# Patient Record
Sex: FEMALE | Race: WHITE | NOT HISPANIC OR LATINO | Employment: FULL TIME | URBAN - NONMETROPOLITAN AREA
[De-identification: names, ages, dates, MRNs, and addresses within clinical notes are randomized per-mention and may not be internally consistent; named-entity substitution may affect disease eponyms.]

---

## 2017-02-06 ENCOUNTER — TRANSCRIBE ORDERS (OUTPATIENT)
Dept: ADMINISTRATIVE | Facility: HOSPITAL | Age: 47
End: 2017-02-06

## 2017-02-06 DIAGNOSIS — Z12.31 VISIT FOR SCREENING MAMMOGRAM: Primary | ICD-10-CM

## 2017-02-06 DIAGNOSIS — Z13.820 SCREENING FOR OSTEOPOROSIS: ICD-10-CM

## 2017-02-16 ENCOUNTER — HOSPITAL ENCOUNTER (OUTPATIENT)
Dept: BONE DENSITY | Facility: HOSPITAL | Age: 47
Discharge: HOME | End: 2017-02-16

## 2017-02-16 ENCOUNTER — TRANSCRIBE ORDERS (OUTPATIENT)
Dept: FAMILY MEDICINE CLINIC | Facility: CLINIC | Age: 47
End: 2017-02-16

## 2017-02-16 ENCOUNTER — LAB (OUTPATIENT)
Dept: LAB | Facility: HOSPITAL | Age: 47
End: 2017-02-16

## 2017-02-16 ENCOUNTER — HOSPITAL ENCOUNTER (OUTPATIENT)
Dept: MAMMOGRAPHY | Facility: HOSPITAL | Age: 47
Discharge: HOME | End: 2017-02-16
Admitting: NURSE PRACTITIONER

## 2017-02-16 DIAGNOSIS — E03.9 HYPOTHYROIDISM, UNSPECIFIED TYPE: ICD-10-CM

## 2017-02-16 DIAGNOSIS — E03.9 HYPOTHYROIDISM, UNSPECIFIED TYPE: Primary | ICD-10-CM

## 2017-02-16 DIAGNOSIS — Z12.31 VISIT FOR SCREENING MAMMOGRAM: ICD-10-CM

## 2017-02-16 DIAGNOSIS — Z13.820 SCREENING FOR OSTEOPOROSIS: ICD-10-CM

## 2017-02-16 LAB
ALBUMIN SERPL-MCNC: 4.4 G/DL (ref 3.5–5)
ALBUMIN/GLOB SERPL: 1.4 G/DL (ref 1.5–2.5)
ALP SERPL-CCNC: 78 U/L (ref 35–104)
ALT SERPL W P-5'-P-CCNC: 15 U/L (ref 10–36)
ANION GAP SERPL CALCULATED.3IONS-SCNC: 3.6 MMOL/L (ref 3.6–11.2)
AST SERPL-CCNC: 23 U/L (ref 10–30)
BILIRUB SERPL-MCNC: 0.6 MG/DL (ref 0.2–1.8)
BUN BLD-MCNC: 19 MG/DL (ref 7–21)
BUN/CREAT SERPL: 17.1 (ref 7–25)
CALCIUM SPEC-SCNC: 9.4 MG/DL (ref 7.7–10)
CHLORIDE SERPL-SCNC: 107 MMOL/L (ref 99–112)
CHOLEST SERPL-MCNC: 214 MG/DL (ref 0–200)
CO2 SERPL-SCNC: 28.4 MMOL/L (ref 24.3–31.9)
CREAT BLD-MCNC: 1.11 MG/DL (ref 0.43–1.29)
GFR SERPL CREATININE-BSD FRML MDRD: 52 ML/MIN/1.73
GLOBULIN UR ELPH-MCNC: 3.2 GM/DL
GLUCOSE BLD-MCNC: 91 MG/DL (ref 70–110)
HDLC SERPL-MCNC: 57 MG/DL (ref 60–100)
LDLC SERPL CALC-MCNC: 135 MG/DL (ref 0–100)
LDLC/HDLC SERPL: 2.36 {RATIO}
OSMOLALITY SERPL CALC.SUM OF ELEC: 279.4 MOSM/KG (ref 273–305)
POTASSIUM BLD-SCNC: 4.2 MMOL/L (ref 3.5–5.3)
PROT SERPL-MCNC: 7.6 G/DL (ref 6–8)
SODIUM BLD-SCNC: 139 MMOL/L (ref 135–153)
T4 FREE SERPL-MCNC: 1.11 NG/DL (ref 0.89–1.76)
TRIGL SERPL-MCNC: 111 MG/DL (ref 0–150)
TSH SERPL DL<=0.05 MIU/L-ACNC: 1.46 MIU/ML (ref 0.55–4.78)
VLDLC SERPL-MCNC: 22.2 MG/DL

## 2017-02-16 PROCEDURE — 77067 SCR MAMMO BI INCL CAD: CPT | Performed by: RADIOLOGY

## 2017-02-16 PROCEDURE — 84439 ASSAY OF FREE THYROXINE: CPT | Performed by: NURSE PRACTITIONER

## 2017-02-16 PROCEDURE — 77063 BREAST TOMOSYNTHESIS BI: CPT

## 2017-02-16 PROCEDURE — 77080 DXA BONE DENSITY AXIAL: CPT | Performed by: RADIOLOGY

## 2017-02-16 PROCEDURE — 80053 COMPREHEN METABOLIC PANEL: CPT | Performed by: NURSE PRACTITIONER

## 2017-02-16 PROCEDURE — 77080 DXA BONE DENSITY AXIAL: CPT

## 2017-02-16 PROCEDURE — 36415 COLL VENOUS BLD VENIPUNCTURE: CPT

## 2017-02-16 PROCEDURE — G0202 SCR MAMMO BI INCL CAD: HCPCS

## 2017-02-16 PROCEDURE — 77063 BREAST TOMOSYNTHESIS BI: CPT | Performed by: RADIOLOGY

## 2017-02-16 PROCEDURE — 80061 LIPID PANEL: CPT | Performed by: NURSE PRACTITIONER

## 2017-02-16 PROCEDURE — 84443 ASSAY THYROID STIM HORMONE: CPT | Performed by: NURSE PRACTITIONER

## 2017-04-07 ENCOUNTER — TRANSCRIBE ORDERS (OUTPATIENT)
Dept: ADMINISTRATIVE | Facility: HOSPITAL | Age: 47
End: 2017-04-07

## 2017-04-07 ENCOUNTER — LAB (OUTPATIENT)
Dept: LAB | Facility: HOSPITAL | Age: 47
End: 2017-04-07

## 2017-04-07 DIAGNOSIS — E55.9 VITAMIN D DEFICIENCY: ICD-10-CM

## 2017-04-07 DIAGNOSIS — R53.83 FATIGUE, UNSPECIFIED TYPE: ICD-10-CM

## 2017-04-07 DIAGNOSIS — Z79.890 NEED FOR PROPHYLACTIC HORMONE REPLACEMENT THERAPY (POSTMENOPAUSAL): Primary | ICD-10-CM

## 2017-04-07 DIAGNOSIS — Z79.890 NEED FOR PROPHYLACTIC HORMONE REPLACEMENT THERAPY (POSTMENOPAUSAL): ICD-10-CM

## 2017-04-07 LAB
25(OH)D3 SERPL-MCNC: 22 NG/ML
ESTRADIOL SERPL HS-MCNC: 20.1 PG/ML
FSH SERPL-ACNC: 56.2 MIU/ML
PROGEST SERPL-MCNC: 0.5 NG/ML
T3RU NFR SERPL: 28.9 % (ref 22.5–37)
T4 SERPL-MCNC: 9.8 MCG/DL (ref 4.5–10.9)
TSH SERPL DL<=0.05 MIU/L-ACNC: 1.2 MIU/ML (ref 0.55–4.78)
VIT B12 BLD-MCNC: 1281 PG/ML (ref 211–911)

## 2017-04-07 PROCEDURE — 84443 ASSAY THYROID STIM HORMONE: CPT | Performed by: NURSE PRACTITIONER

## 2017-04-07 PROCEDURE — 82627 DEHYDROEPIANDROSTERONE: CPT | Performed by: NURSE PRACTITIONER

## 2017-04-07 PROCEDURE — 82306 VITAMIN D 25 HYDROXY: CPT | Performed by: NURSE PRACTITIONER

## 2017-04-07 PROCEDURE — 84403 ASSAY OF TOTAL TESTOSTERONE: CPT | Performed by: NURSE PRACTITIONER

## 2017-04-07 PROCEDURE — 82607 VITAMIN B-12: CPT | Performed by: NURSE PRACTITIONER

## 2017-04-07 PROCEDURE — 84436 ASSAY OF TOTAL THYROXINE: CPT | Performed by: NURSE PRACTITIONER

## 2017-04-07 PROCEDURE — 83001 ASSAY OF GONADOTROPIN (FSH): CPT | Performed by: NURSE PRACTITIONER

## 2017-04-07 PROCEDURE — 84402 ASSAY OF FREE TESTOSTERONE: CPT | Performed by: NURSE PRACTITIONER

## 2017-04-07 PROCEDURE — 84479 ASSAY OF THYROID (T3 OR T4): CPT | Performed by: NURSE PRACTITIONER

## 2017-04-07 PROCEDURE — 82670 ASSAY OF TOTAL ESTRADIOL: CPT | Performed by: NURSE PRACTITIONER

## 2017-04-07 PROCEDURE — 84144 ASSAY OF PROGESTERONE: CPT | Performed by: NURSE PRACTITIONER

## 2017-04-07 PROCEDURE — 36415 COLL VENOUS BLD VENIPUNCTURE: CPT

## 2017-04-08 LAB
DHEA-S SERPL-MCNC: 149.4 UG/DL (ref 41.2–243.7)
TESTOST FREE SERPL-MCNC: 1.9 PG/ML (ref 0–4.2)
TESTOST SERPL-MCNC: 20 NG/DL (ref 8–48)

## 2017-08-24 ENCOUNTER — TRANSCRIBE ORDERS (OUTPATIENT)
Dept: ADMINISTRATIVE | Facility: HOSPITAL | Age: 47
End: 2017-08-24

## 2017-08-24 ENCOUNTER — LAB (OUTPATIENT)
Dept: LAB | Facility: HOSPITAL | Age: 47
End: 2017-08-24

## 2017-08-24 DIAGNOSIS — E03.9 UNSPECIFIED HYPOTHYROIDISM: ICD-10-CM

## 2017-08-24 DIAGNOSIS — E78.5 HYPERLIPIDEMIA, UNSPECIFIED HYPERLIPIDEMIA TYPE: Primary | ICD-10-CM

## 2017-08-24 DIAGNOSIS — E78.5 HYPERLIPIDEMIA, UNSPECIFIED HYPERLIPIDEMIA TYPE: ICD-10-CM

## 2017-08-24 LAB
ALBUMIN SERPL-MCNC: 4.6 G/DL (ref 3.5–5)
ALBUMIN/GLOB SERPL: 1.6 G/DL (ref 1.5–2.5)
ALP SERPL-CCNC: 70 U/L (ref 35–104)
ALT SERPL W P-5'-P-CCNC: 16 U/L (ref 10–36)
ANION GAP SERPL CALCULATED.3IONS-SCNC: 2.1 MMOL/L (ref 3.6–11.2)
AST SERPL-CCNC: 25 U/L (ref 10–30)
BASOPHILS # BLD AUTO: 0.08 10*3/MM3 (ref 0–0.3)
BASOPHILS NFR BLD AUTO: 1.5 % (ref 0–2)
BILIRUB SERPL-MCNC: 0.5 MG/DL (ref 0.2–1.8)
BUN BLD-MCNC: 15 MG/DL (ref 7–21)
BUN/CREAT SERPL: 14.3 (ref 7–25)
CALCIUM SPEC-SCNC: 9.7 MG/DL (ref 7.7–10)
CHLORIDE SERPL-SCNC: 109 MMOL/L (ref 99–112)
CHOLEST SERPL-MCNC: 166 MG/DL (ref 0–200)
CO2 SERPL-SCNC: 28.9 MMOL/L (ref 24.3–31.9)
CREAT BLD-MCNC: 1.05 MG/DL (ref 0.43–1.29)
DEPRECATED RDW RBC AUTO: 40.7 FL (ref 37–54)
EOSINOPHIL # BLD AUTO: 0.18 10*3/MM3 (ref 0–0.7)
EOSINOPHIL NFR BLD AUTO: 3.5 % (ref 0–5)
ERYTHROCYTE [DISTWIDTH] IN BLOOD BY AUTOMATED COUNT: 12.5 % (ref 11.5–14.5)
GFR SERPL CREATININE-BSD FRML MDRD: 56 ML/MIN/1.73
GLOBULIN UR ELPH-MCNC: 2.9 GM/DL
GLUCOSE BLD-MCNC: 94 MG/DL (ref 70–110)
HCT VFR BLD AUTO: 41.7 % (ref 37–47)
HDLC SERPL-MCNC: 52 MG/DL (ref 60–100)
HGB BLD-MCNC: 13.8 G/DL (ref 12–16)
IMM GRANULOCYTES # BLD: 0.01 10*3/MM3 (ref 0–0.03)
IMM GRANULOCYTES NFR BLD: 0.2 % (ref 0–0.5)
LDLC SERPL CALC-MCNC: 95 MG/DL (ref 0–100)
LDLC/HDLC SERPL: 1.82 {RATIO}
LYMPHOCYTES # BLD AUTO: 1.7 10*3/MM3 (ref 1–3)
LYMPHOCYTES NFR BLD AUTO: 32.7 % (ref 21–51)
MCH RBC QN AUTO: 29.9 PG (ref 27–33)
MCHC RBC AUTO-ENTMCNC: 33.1 G/DL (ref 33–37)
MCV RBC AUTO: 90.3 FL (ref 80–94)
MONOCYTES # BLD AUTO: 0.4 10*3/MM3 (ref 0.1–0.9)
MONOCYTES NFR BLD AUTO: 7.7 % (ref 0–10)
NEUTROPHILS # BLD AUTO: 2.83 10*3/MM3 (ref 1.4–6.5)
NEUTROPHILS NFR BLD AUTO: 54.4 % (ref 30–70)
OSMOLALITY SERPL CALC.SUM OF ELEC: 280 MOSM/KG (ref 273–305)
PLATELET # BLD AUTO: 228 10*3/MM3 (ref 130–400)
PMV BLD AUTO: 11.3 FL (ref 6–10)
POTASSIUM BLD-SCNC: 4.2 MMOL/L (ref 3.5–5.3)
PROT SERPL-MCNC: 7.5 G/DL (ref 6–8)
RBC # BLD AUTO: 4.62 10*6/MM3 (ref 4.2–5.4)
SODIUM BLD-SCNC: 140 MMOL/L (ref 135–153)
T4 SERPL-MCNC: 6.3 MCG/DL (ref 4.5–10.9)
TRIGL SERPL-MCNC: 96 MG/DL (ref 0–150)
TSH SERPL DL<=0.05 MIU/L-ACNC: 1.97 MIU/ML (ref 0.55–4.78)
VLDLC SERPL-MCNC: 19.2 MG/DL
WBC NRBC COR # BLD: 5.2 10*3/MM3 (ref 4.5–12.5)

## 2017-08-24 PROCEDURE — 80053 COMPREHEN METABOLIC PANEL: CPT | Performed by: NURSE PRACTITIONER

## 2017-08-24 PROCEDURE — 85025 COMPLETE CBC W/AUTO DIFF WBC: CPT | Performed by: NURSE PRACTITIONER

## 2017-08-24 PROCEDURE — 36415 COLL VENOUS BLD VENIPUNCTURE: CPT

## 2017-08-24 PROCEDURE — 84443 ASSAY THYROID STIM HORMONE: CPT | Performed by: NURSE PRACTITIONER

## 2017-08-24 PROCEDURE — 80061 LIPID PANEL: CPT | Performed by: NURSE PRACTITIONER

## 2017-08-24 PROCEDURE — 84436 ASSAY OF TOTAL THYROXINE: CPT | Performed by: NURSE PRACTITIONER

## 2017-08-25 ENCOUNTER — TRANSCRIBE ORDERS (OUTPATIENT)
Dept: ADMINISTRATIVE | Facility: HOSPITAL | Age: 47
End: 2017-08-25

## 2017-08-25 ENCOUNTER — HOSPITAL ENCOUNTER (OUTPATIENT)
Dept: RESPIRATORY THERAPY | Facility: HOSPITAL | Age: 47
Discharge: HOME | End: 2017-08-25
Admitting: NURSE PRACTITIONER

## 2017-08-25 DIAGNOSIS — R00.1 BRADYCARDIA: ICD-10-CM

## 2017-08-25 DIAGNOSIS — R00.1 BRADYCARDIA: Primary | ICD-10-CM

## 2017-08-25 PROCEDURE — 93005 ELECTROCARDIOGRAM TRACING: CPT | Performed by: NURSE PRACTITIONER

## 2017-08-25 PROCEDURE — 93010 ELECTROCARDIOGRAM REPORT: CPT | Performed by: INTERNAL MEDICINE

## 2017-08-31 ENCOUNTER — OFFICE VISIT (OUTPATIENT)
Dept: CARDIOLOGY | Facility: CLINIC | Age: 47
End: 2017-08-31

## 2017-08-31 VITALS
BODY MASS INDEX: 32.43 KG/M2 | HEART RATE: 72 BPM | HEIGHT: 63 IN | WEIGHT: 183 LBS | SYSTOLIC BLOOD PRESSURE: 106 MMHG | RESPIRATION RATE: 16 BRPM | DIASTOLIC BLOOD PRESSURE: 73 MMHG

## 2017-08-31 DIAGNOSIS — R00.1 SINUS BRADYCARDIA: Primary | ICD-10-CM

## 2017-08-31 DIAGNOSIS — E78.5 DYSLIPIDEMIA: ICD-10-CM

## 2017-08-31 DIAGNOSIS — E03.9 HYPOTHYROIDISM, UNSPECIFIED TYPE: ICD-10-CM

## 2017-08-31 PROCEDURE — 99204 OFFICE O/P NEW MOD 45 MIN: CPT | Performed by: INTERNAL MEDICINE

## 2017-08-31 RX ORDER — LORATADINE 10 MG/1
10 TABLET ORAL DAILY
COMMUNITY
End: 2022-09-12

## 2017-09-02 ENCOUNTER — HOSPITAL ENCOUNTER (OUTPATIENT)
Dept: CARDIOLOGY | Facility: HOSPITAL | Age: 47
Discharge: HOME | End: 2017-09-02
Attending: INTERNAL MEDICINE | Admitting: INTERNAL MEDICINE

## 2017-09-02 DIAGNOSIS — R00.1 SINUS BRADYCARDIA: ICD-10-CM

## 2017-09-02 PROCEDURE — 93306 TTE W/DOPPLER COMPLETE: CPT

## 2017-09-02 PROCEDURE — 93306 TTE W/DOPPLER COMPLETE: CPT | Performed by: INTERNAL MEDICINE

## 2017-09-03 ENCOUNTER — HOSPITAL ENCOUNTER (OUTPATIENT)
Dept: RESPIRATORY THERAPY | Facility: HOSPITAL | Age: 47
Discharge: HOME | End: 2017-09-03
Attending: INTERNAL MEDICINE | Admitting: INTERNAL MEDICINE

## 2017-09-03 DIAGNOSIS — R00.1 SINUS BRADYCARDIA: ICD-10-CM

## 2017-09-03 PROCEDURE — 93227 XTRNL ECG REC<48 HR R&I: CPT | Performed by: INTERNAL MEDICINE

## 2017-09-03 PROCEDURE — 93225 XTRNL ECG REC<48 HRS REC: CPT

## 2017-09-03 PROCEDURE — 93226 XTRNL ECG REC<48 HR SCAN A/R: CPT

## 2017-09-06 LAB
BH CV ECHO MEAS - % IVS THICK: 35 %
BH CV ECHO MEAS - % LVPW THICK: 8 %
BH CV ECHO MEAS - ACS: 1.5 CM
BH CV ECHO MEAS - AO ROOT AREA (BSA CORRECTED): 1.3
BH CV ECHO MEAS - AO ROOT AREA: 4.7 CM^2
BH CV ECHO MEAS - AO ROOT DIAM: 2.5 CM
BH CV ECHO MEAS - BSA(HAYCOCK): 1.9 M^2
BH CV ECHO MEAS - BSA: 1.8 M^2
BH CV ECHO MEAS - BZI_BMI: 31.5 KILOGRAMS/M^2
BH CV ECHO MEAS - BZI_METRIC_HEIGHT: 160 CM
BH CV ECHO MEAS - BZI_METRIC_WEIGHT: 80.7 KG
BH CV ECHO MEAS - CONTRAST EF 4CH: 58.8 ML/M^2
BH CV ECHO MEAS - EDV(CUBED): 124 ML
BH CV ECHO MEAS - EDV(MOD-SP4): 34 ML
BH CV ECHO MEAS - EDV(TEICH): 117.5 ML
BH CV ECHO MEAS - EF(CUBED): 57.8 %
BH CV ECHO MEAS - EF(MOD-SP4): 58.8 %
BH CV ECHO MEAS - EF(TEICH): 49.2 %
BH CV ECHO MEAS - ESV(CUBED): 52.3 ML
BH CV ECHO MEAS - ESV(MOD-SP4): 14 ML
BH CV ECHO MEAS - ESV(TEICH): 59.6 ML
BH CV ECHO MEAS - FS: 25 %
BH CV ECHO MEAS - IVS/LVPW: 0.8
BH CV ECHO MEAS - IVSD: 0.73 CM
BH CV ECHO MEAS - IVSS: 0.99 CM
BH CV ECHO MEAS - LA DIMENSION: 2.6 CM
BH CV ECHO MEAS - LA/AO: 1
BH CV ECHO MEAS - LV DIASTOLIC VOL/BSA (35-75): 18.5 ML/M^2
BH CV ECHO MEAS - LV MASS(C)D: 140.6 GRAMS
BH CV ECHO MEAS - LV MASS(C)DI: 76.4 GRAMS/M^2
BH CV ECHO MEAS - LV MASS(C)S: 112.8 GRAMS
BH CV ECHO MEAS - LV MASS(C)SI: 61.3 GRAMS/M^2
BH CV ECHO MEAS - LV SYSTOLIC VOL/BSA (12-30): 7.6 ML/M^2
BH CV ECHO MEAS - LVIDD: 5 CM
BH CV ECHO MEAS - LVIDS: 3.7 CM
BH CV ECHO MEAS - LVLD AP4: 5.8 CM
BH CV ECHO MEAS - LVLS AP4: 4.4 CM
BH CV ECHO MEAS - LVOT AREA (M): 2.3 CM^2
BH CV ECHO MEAS - LVOT AREA: 2.2 CM^2
BH CV ECHO MEAS - LVOT DIAM: 1.7 CM
BH CV ECHO MEAS - LVPWD: 0.92 CM
BH CV ECHO MEAS - LVPWS: 0.99 CM
BH CV ECHO MEAS - MV A MAX VEL: 64.2 CM/SEC
BH CV ECHO MEAS - MV DEC SLOPE: 599 CM/SEC^2
BH CV ECHO MEAS - MV E MAX VEL: 109.1 CM/SEC
BH CV ECHO MEAS - MV E/A: 1.7
BH CV ECHO MEAS - MV P1/2T MAX VEL: 111.3 CM/SEC
BH CV ECHO MEAS - MV P1/2T: 54.4 MSEC
BH CV ECHO MEAS - MVA P1/2T LCG: 2 CM^2
BH CV ECHO MEAS - MVA(P1/2T): 4 CM^2
BH CV ECHO MEAS - RAP SYSTOLE: 10 MMHG
BH CV ECHO MEAS - RVDD: 0.88 CM
BH CV ECHO MEAS - RVSP: 37.7 MMHG
BH CV ECHO MEAS - SI(CUBED): 38.9 ML/M^2
BH CV ECHO MEAS - SI(MOD-SP4): 10.9 ML/M^2
BH CV ECHO MEAS - SI(TEICH): 31.4 ML/M^2
BH CV ECHO MEAS - SV(CUBED): 71.7 ML
BH CV ECHO MEAS - SV(MOD-SP4): 20 ML
BH CV ECHO MEAS - SV(TEICH): 57.9 ML
BH CV ECHO MEAS - TR MAX VEL: 263.3 CM/SEC

## 2017-10-12 ENCOUNTER — LAB (OUTPATIENT)
Dept: LAB | Facility: HOSPITAL | Age: 47
End: 2017-10-12

## 2017-10-12 ENCOUNTER — OFFICE VISIT (OUTPATIENT)
Dept: CARDIOLOGY | Facility: CLINIC | Age: 47
End: 2017-10-12

## 2017-10-12 ENCOUNTER — TRANSCRIBE ORDERS (OUTPATIENT)
Dept: ADMINISTRATIVE | Facility: HOSPITAL | Age: 47
End: 2017-10-12

## 2017-10-12 VITALS
WEIGHT: 183 LBS | BODY MASS INDEX: 32.43 KG/M2 | DIASTOLIC BLOOD PRESSURE: 79 MMHG | HEART RATE: 76 BPM | SYSTOLIC BLOOD PRESSURE: 111 MMHG | HEIGHT: 63 IN

## 2017-10-12 DIAGNOSIS — Z79.890 POSTMENOPAUSAL HORMONE THERAPY: Primary | ICD-10-CM

## 2017-10-12 DIAGNOSIS — E03.9 HYPOTHYROIDISM, UNSPECIFIED TYPE: ICD-10-CM

## 2017-10-12 DIAGNOSIS — R53.83 EXTREME EXHAUSTION: ICD-10-CM

## 2017-10-12 DIAGNOSIS — R00.1 SINUS BRADYCARDIA: Primary | ICD-10-CM

## 2017-10-12 DIAGNOSIS — R42 DIZZINESS: ICD-10-CM

## 2017-10-12 DIAGNOSIS — Z79.890 POSTMENOPAUSAL HORMONE THERAPY: ICD-10-CM

## 2017-10-12 DIAGNOSIS — E78.5 DYSLIPIDEMIA: ICD-10-CM

## 2017-10-12 DIAGNOSIS — E55.9 VITAMIN D INSUFFICIENCY: ICD-10-CM

## 2017-10-12 LAB
25(OH)D3 SERPL-MCNC: 33 NG/ML
ESTRADIOL SERPL HS-MCNC: 32.8 PG/ML
FSH SERPL-ACNC: 65.6 MIU/ML
PROGEST SERPL-MCNC: 1 NG/ML
T3RU NFR SERPL: 30 % (ref 22.5–37)
T4 SERPL-MCNC: 5.9 MCG/DL (ref 4.5–10.9)
TSH SERPL DL<=0.05 MIU/L-ACNC: 1.78 MIU/ML (ref 0.55–4.78)

## 2017-10-12 PROCEDURE — 84403 ASSAY OF TOTAL TESTOSTERONE: CPT | Performed by: NURSE PRACTITIONER

## 2017-10-12 PROCEDURE — 82627 DEHYDROEPIANDROSTERONE: CPT | Performed by: NURSE PRACTITIONER

## 2017-10-12 PROCEDURE — 99213 OFFICE O/P EST LOW 20 MIN: CPT | Performed by: PHYSICIAN ASSISTANT

## 2017-10-12 PROCEDURE — 84402 ASSAY OF FREE TESTOSTERONE: CPT | Performed by: NURSE PRACTITIONER

## 2017-10-12 PROCEDURE — 82670 ASSAY OF TOTAL ESTRADIOL: CPT | Performed by: NURSE PRACTITIONER

## 2017-10-12 PROCEDURE — 84436 ASSAY OF TOTAL THYROXINE: CPT | Performed by: NURSE PRACTITIONER

## 2017-10-12 PROCEDURE — 84479 ASSAY OF THYROID (T3 OR T4): CPT | Performed by: NURSE PRACTITIONER

## 2017-10-12 PROCEDURE — 84443 ASSAY THYROID STIM HORMONE: CPT | Performed by: NURSE PRACTITIONER

## 2017-10-12 PROCEDURE — 83001 ASSAY OF GONADOTROPIN (FSH): CPT | Performed by: NURSE PRACTITIONER

## 2017-10-12 PROCEDURE — 84144 ASSAY OF PROGESTERONE: CPT | Performed by: NURSE PRACTITIONER

## 2017-10-12 PROCEDURE — 82306 VITAMIN D 25 HYDROXY: CPT | Performed by: NURSE PRACTITIONER

## 2017-10-12 PROCEDURE — 36415 COLL VENOUS BLD VENIPUNCTURE: CPT

## 2017-10-12 NOTE — PROGRESS NOTES
JULY Rockwell  Kristy Iqbal  1970  10/12/2017    Patient Active Problem List   Diagnosis   • Sinus bradycardia in the 50s.   • Dyslipidemia   • Hypothyroidism     Dear JULY Rockwell:    Chief Complaint   Patient presents with   • Slow Heart Rate     bradycardia follow up   • Results     holter and echo     Subjective     Kristy Iqbal is a 47 y.o. female with a past medical history significant for Previously noted mild sinus bradycardia in the 50s as well as intermittent low blood pressure both associated with mild dizziness.  She recently had a 24-hour Holter monitor revealing an average heart rate of 64 bpm.  There were occasional PACs and PVCs.  Lowest heart rate was 46 bpm which occurred at 4:12 AM.  There were occasional PACs and PVCs.  She also had a transthoracic echocardiogram revealing a normal ejection fraction at 60-65%.  There is no significant valvular dysfunction and no pericardial effusion. She presents back to the office today. She has not had any significant episodes of palpitations or dizziness/near syncope. She had one episode of faster heart beat when she got up in the middle of the night to go to the restroom. It occurred when she was getting back in bed. No further since then. She did not check her BP at that time. No know history of sugar issues or gestational diabetes with her pregnancies. She denies any chest pains or shortness of breath.       Current Outpatient Prescriptions:   •  DHEA 10 MG capsule, Take 10 mg by mouth Daily., Disp: , Rfl:   •  loratadine (CLARITIN) 10 MG tablet, Take 10 mg by mouth Daily., Disp: , Rfl:   •  rosuvastatin (CRESTOR) 5 MG tablet, Take 1 tablet by mouth Daily., Disp: 30 tablet, Rfl: 5  •  Thyroid (ARMOUR THYROID) 60 MG PO tablet, Take 1 tablet by mouth Daily., Disp: 30 tablet, Rfl: 3    The following portions of the patient's history were reviewed and updated as appropriate: allergies, current medications, past family history, past  "medical history, past social history, past surgical history and problem list.    Social History     Social History   • Marital status:      Spouse name: N/A   • Number of children: N/A   • Years of education: N/A     Occupational History   • Not on file.     Social History Main Topics   • Smoking status: Never Smoker   • Smokeless tobacco: Never Used   • Alcohol use No   • Drug use: No   • Sexual activity: Not on file     Other Topics Concern   • Not on file     Social History Narrative     Review of Systems   Constitution: Negative for chills, fever and malaise/fatigue.   Cardiovascular: Positive for palpitations (once in awhile). Negative for chest pain, leg swelling, near-syncope and syncope.   Respiratory: Negative for shortness of breath.    Neurological: Dizziness: occasionally. Light-headedness: occasionally.     Objective   Blood pressure 111/79, pulse 76, height 63\" (160 cm), weight 183 lb (83 kg).    Physical Exam   Constitutional: She is oriented to person, place, and time. She appears well-developed and well-nourished. No distress.   HENT:   Head: Normocephalic and atraumatic.   Eyes: Conjunctivae are normal. Right eye exhibits no discharge. Left eye exhibits no discharge.   Neck: Normal range of motion. Neck supple. Carotid bruit is not present.   Cardiovascular: Normal rate, regular rhythm and normal heart sounds.  Exam reveals no gallop and no friction rub.    No murmur heard.  Pulmonary/Chest: Effort normal and breath sounds normal. No respiratory distress. She has no wheezes. She has no rales. She exhibits no tenderness.   Musculoskeletal: Normal range of motion. She exhibits no edema.   Neurological: She is alert and oriented to person, place, and time.   Skin: Skin is warm and dry. No rash noted. She is not diaphoretic. No erythema. No pallor.   Psychiatric: She has a normal mood and affect. Her behavior is normal.   Nursing note and vitals reviewed.     Procedures   24-hour Holter " monitoring 09/03/17  · A relatively benign monitor study.  · The predominant rhythm noted during the testing period was sinus rhythm  · Average HR: 64. Min HR: 46(4:12 am). Max HR: 138(8:30).  · Occasional PACs and PVCs. No SVT or V.Tach.  · Sinoatrial node conduction was normal. No atrioventricular block noted.  · No symptoms reported during the monitoring period.    Transthoracic echocardiogram 9/02/17  · Normal left ventricular cavity size and wall thickness noted. All left ventricular wall segments contract normally.  · Estimated EF appears to be in the range of 61 - 65%.  · The aortic valve is structurally normal. No aortic valve regurgitation is present. No aortic valve stenosis is present.  · The mitral valve is normal in structure. No mitral valve regurgitation is present. No significant mitral valve stenosis is present.  · The tricuspid valve is normal. No tricuspid valve stenosis is present. No tricuspid valve regurgitation is present. Estimated right ventricular systolic pressure from tricuspid regurgitation is normal (<35 mmHg).  · There is no evidence of pericardial effusion.      Assessment:        Diagnosis Plan   1. Sinus bradycardia in the 50s.      24-hour Holter revealed mild sinus bradycardia with a minimum of 46 at 4:12 AM.  Patient states she was very likely to be sleeping at that time.   2. Dizziness      Likely secondary to baseline low blood pressure.  No near-syncope or actual syncopal episodes.   3. Dyslipidemia     4. Hypothyroidism, unspecified type          Plan:       1. Encouraged her to drink plenty of water as well as Gatorade to keep her blood pressure up.  If this does not help, we'll consider a 30 day event monitor.  2. At this time, she is not experiencing any chest pains, discomfort, or shortness of breath, therefore will hold off on stress testing for now.  We will consider it later on should she develop any of these symptoms or changes on her EKG.  3. We will follow-up in 3  months or sooner if needed.    No Follow-up on file.    I appreciate the opportunity to participate in this patient's cardiovascular care.    Best Regards,    Eli Villalta PA-C

## 2017-10-13 LAB — DHEA-S SERPL-MCNC: 321 UG/DL (ref 41.2–243.7)

## 2017-10-14 LAB
TESTOST FREE SERPL-MCNC: 3 PG/ML (ref 0–4.2)
TESTOST SERPL-MCNC: 53 NG/DL (ref 8–48)

## 2018-02-23 ENCOUNTER — TRANSCRIBE ORDERS (OUTPATIENT)
Dept: ADMINISTRATIVE | Facility: HOSPITAL | Age: 48
End: 2018-02-23

## 2018-02-23 ENCOUNTER — LAB (OUTPATIENT)
Dept: LAB | Facility: HOSPITAL | Age: 48
End: 2018-02-23

## 2018-02-23 DIAGNOSIS — E78.5 DYSLIPIDEMIA: ICD-10-CM

## 2018-02-23 DIAGNOSIS — E78.5 DYSLIPIDEMIA: Primary | ICD-10-CM

## 2018-02-23 LAB
ALBUMIN SERPL-MCNC: 4.4 G/DL (ref 3.5–5)
ALBUMIN/GLOB SERPL: 1.4 G/DL (ref 1.5–2.5)
ALP SERPL-CCNC: 58 U/L (ref 35–104)
ALT SERPL W P-5'-P-CCNC: 30 U/L (ref 10–36)
ANION GAP SERPL CALCULATED.3IONS-SCNC: 3.4 MMOL/L (ref 3.6–11.2)
AST SERPL-CCNC: 31 U/L (ref 10–30)
BILIRUB SERPL-MCNC: 0.7 MG/DL (ref 0.2–1.8)
BUN BLD-MCNC: 18 MG/DL (ref 7–21)
BUN/CREAT SERPL: 18.6 (ref 7–25)
CALCIUM SPEC-SCNC: 9.3 MG/DL (ref 7.7–10)
CHLORIDE SERPL-SCNC: 108 MMOL/L (ref 99–112)
CHOLEST SERPL-MCNC: 168 MG/DL (ref 0–200)
CO2 SERPL-SCNC: 27.6 MMOL/L (ref 24.3–31.9)
CREAT BLD-MCNC: 0.97 MG/DL (ref 0.43–1.29)
GFR SERPL CREATININE-BSD FRML MDRD: 61 ML/MIN/1.73
GLOBULIN UR ELPH-MCNC: 3.2 GM/DL
GLUCOSE BLD-MCNC: 88 MG/DL (ref 70–110)
HDLC SERPL-MCNC: 53 MG/DL (ref 60–100)
LDLC SERPL CALC-MCNC: 98 MG/DL (ref 0–100)
LDLC/HDLC SERPL: 1.86 {RATIO}
OSMOLALITY SERPL CALC.SUM OF ELEC: 278.9 MOSM/KG (ref 273–305)
POTASSIUM BLD-SCNC: 4.4 MMOL/L (ref 3.5–5.3)
PROT SERPL-MCNC: 7.6 G/DL (ref 6–8)
SODIUM BLD-SCNC: 139 MMOL/L (ref 135–153)
TRIGL SERPL-MCNC: 83 MG/DL (ref 0–150)
VLDLC SERPL-MCNC: 16.6 MG/DL

## 2018-02-23 PROCEDURE — 80061 LIPID PANEL: CPT

## 2018-02-23 PROCEDURE — 80053 COMPREHEN METABOLIC PANEL: CPT

## 2018-02-23 PROCEDURE — 36415 COLL VENOUS BLD VENIPUNCTURE: CPT

## 2018-06-22 ENCOUNTER — TRANSCRIBE ORDERS (OUTPATIENT)
Dept: ADMINISTRATIVE | Facility: HOSPITAL | Age: 48
End: 2018-06-22

## 2018-06-22 ENCOUNTER — LAB (OUTPATIENT)
Dept: LAB | Facility: HOSPITAL | Age: 48
End: 2018-06-22

## 2018-06-22 DIAGNOSIS — R53.83 TIREDNESS: ICD-10-CM

## 2018-06-22 DIAGNOSIS — E55.9 AVITAMINOSIS D: ICD-10-CM

## 2018-06-22 DIAGNOSIS — Z79.890 NEED FOR PROPHYLACTIC HORMONE REPLACEMENT THERAPY (POSTMENOPAUSAL): ICD-10-CM

## 2018-06-22 DIAGNOSIS — Z79.890 NEED FOR PROPHYLACTIC HORMONE REPLACEMENT THERAPY (POSTMENOPAUSAL): Primary | ICD-10-CM

## 2018-06-22 LAB
25(OH)D3 SERPL-MCNC: 34 NG/ML
FSH SERPL-ACNC: 25.2 MIU/ML
PROGEST SERPL-MCNC: 0.7 NG/ML
T3RU NFR SERPL: 34.8 % (ref 22.5–37)
T4 SERPL-MCNC: 4.3 MCG/DL (ref 4.5–10.9)
TSH SERPL DL<=0.05 MIU/L-ACNC: 1.66 MIU/ML (ref 0.55–4.78)
VIT B12 BLD-MCNC: 522 PG/ML (ref 211–911)

## 2018-06-22 PROCEDURE — 36415 COLL VENOUS BLD VENIPUNCTURE: CPT

## 2018-06-22 PROCEDURE — 84436 ASSAY OF TOTAL THYROXINE: CPT

## 2018-06-22 PROCEDURE — 84443 ASSAY THYROID STIM HORMONE: CPT

## 2018-06-22 PROCEDURE — 82306 VITAMIN D 25 HYDROXY: CPT

## 2018-06-22 PROCEDURE — 84144 ASSAY OF PROGESTERONE: CPT

## 2018-06-22 PROCEDURE — 84403 ASSAY OF TOTAL TESTOSTERONE: CPT

## 2018-06-22 PROCEDURE — 84479 ASSAY OF THYROID (T3 OR T4): CPT

## 2018-06-22 PROCEDURE — 82627 DEHYDROEPIANDROSTERONE: CPT

## 2018-06-22 PROCEDURE — 84402 ASSAY OF FREE TESTOSTERONE: CPT

## 2018-06-22 PROCEDURE — 83001 ASSAY OF GONADOTROPIN (FSH): CPT

## 2018-06-22 PROCEDURE — 82607 VITAMIN B-12: CPT

## 2018-06-24 LAB — DHEA-S SERPL-MCNC: 120.6 UG/DL (ref 41.2–243.7)

## 2018-06-26 ENCOUNTER — LAB (OUTPATIENT)
Dept: LAB | Facility: HOSPITAL | Age: 48
End: 2018-06-26

## 2018-06-26 ENCOUNTER — TRANSCRIBE ORDERS (OUTPATIENT)
Dept: ADMINISTRATIVE | Facility: HOSPITAL | Age: 48
End: 2018-06-26

## 2018-06-26 DIAGNOSIS — Z79.890 NEED FOR PROPHYLACTIC HORMONE REPLACEMENT THERAPY (POSTMENOPAUSAL): ICD-10-CM

## 2018-06-26 DIAGNOSIS — Z79.890 NEED FOR PROPHYLACTIC HORMONE REPLACEMENT THERAPY (POSTMENOPAUSAL): Primary | ICD-10-CM

## 2018-06-26 LAB
ESTRADIOL SERPL HS-MCNC: 52.2 PG/ML
TESTOST FREE SERPL-MCNC: 0.7 PG/ML (ref 0–4.2)
TESTOST SERPL-MCNC: 37 NG/DL (ref 3–41)

## 2018-06-26 PROCEDURE — 82670 ASSAY OF TOTAL ESTRADIOL: CPT

## 2018-06-26 PROCEDURE — 36415 COLL VENOUS BLD VENIPUNCTURE: CPT

## 2018-06-26 PROCEDURE — 84402 ASSAY OF FREE TESTOSTERONE: CPT

## 2018-06-26 PROCEDURE — 84403 ASSAY OF TOTAL TESTOSTERONE: CPT

## 2018-06-29 LAB
TESTOST FREE SERPL-MCNC: 1.1 PG/ML (ref 0–4.2)
TESTOST SERPL-MCNC: 51 NG/DL (ref 3–41)

## 2018-12-11 RX ORDER — LEVOFLOXACIN 750 MG/1
750 TABLET ORAL DAILY
Qty: 7 TABLET | Refills: 0 | Status: SHIPPED | OUTPATIENT
Start: 2018-12-11 | End: 2022-09-12

## 2019-01-17 ENCOUNTER — TRANSCRIBE ORDERS (OUTPATIENT)
Dept: ADMINISTRATIVE | Facility: HOSPITAL | Age: 49
End: 2019-01-17

## 2019-01-17 ENCOUNTER — LAB (OUTPATIENT)
Dept: LAB | Facility: HOSPITAL | Age: 49
End: 2019-01-17

## 2019-01-17 DIAGNOSIS — N95.1 SYMPTOMATIC MENOPAUSAL OR FEMALE CLIMACTERIC STATES: ICD-10-CM

## 2019-01-17 DIAGNOSIS — E55.9 VITAMIN D DEFICIENCY: Primary | ICD-10-CM

## 2019-01-17 DIAGNOSIS — E55.9 VITAMIN D DEFICIENCY: ICD-10-CM

## 2019-01-17 LAB
25(OH)D3 SERPL-MCNC: 35 NG/ML
ESTRADIOL SERPL HS-MCNC: 49.8 PG/ML
FSH SERPL-ACNC: 48.7 MIU/ML
PROGEST SERPL-MCNC: 1.7 NG/ML
T3RU NFR SERPL: 35.2 % (ref 22.5–37)
T4 SERPL-MCNC: 4.8 MCG/DL (ref 4.5–10.9)
TSH SERPL DL<=0.05 MIU/L-ACNC: 0.9 MIU/ML (ref 0.55–4.78)

## 2019-01-17 PROCEDURE — 84479 ASSAY OF THYROID (T3 OR T4): CPT

## 2019-01-17 PROCEDURE — 82627 DEHYDROEPIANDROSTERONE: CPT

## 2019-01-17 PROCEDURE — 84436 ASSAY OF TOTAL THYROXINE: CPT

## 2019-01-17 PROCEDURE — 82670 ASSAY OF TOTAL ESTRADIOL: CPT

## 2019-01-17 PROCEDURE — 84403 ASSAY OF TOTAL TESTOSTERONE: CPT

## 2019-01-17 PROCEDURE — 83001 ASSAY OF GONADOTROPIN (FSH): CPT

## 2019-01-17 PROCEDURE — 84144 ASSAY OF PROGESTERONE: CPT

## 2019-01-17 PROCEDURE — 82306 VITAMIN D 25 HYDROXY: CPT

## 2019-01-17 PROCEDURE — 36415 COLL VENOUS BLD VENIPUNCTURE: CPT

## 2019-01-17 PROCEDURE — 84443 ASSAY THYROID STIM HORMONE: CPT

## 2019-01-17 PROCEDURE — 84402 ASSAY OF FREE TESTOSTERONE: CPT

## 2019-01-18 LAB
DHEA-S SERPL-MCNC: 164.2 UG/DL (ref 41.2–243.7)
TESTOST FREE SERPL-MCNC: 2.6 PG/ML (ref 0–4.2)
TESTOST SERPL-MCNC: 70 NG/DL (ref 3–41)

## 2019-02-27 ENCOUNTER — LAB (OUTPATIENT)
Dept: LAB | Facility: HOSPITAL | Age: 49
End: 2019-02-27

## 2019-02-27 ENCOUNTER — TRANSCRIBE ORDERS (OUTPATIENT)
Dept: ADMINISTRATIVE | Facility: HOSPITAL | Age: 49
End: 2019-02-27

## 2019-02-27 DIAGNOSIS — E78.2 MIXED HYPERLIPIDEMIA: Primary | ICD-10-CM

## 2019-02-27 DIAGNOSIS — E78.2 MIXED HYPERLIPIDEMIA: ICD-10-CM

## 2019-02-27 LAB
ALBUMIN SERPL-MCNC: 4.3 G/DL (ref 3.5–5)
ALBUMIN/GLOB SERPL: 1.5 G/DL (ref 1.5–2.5)
ALP SERPL-CCNC: 81 U/L (ref 35–104)
ALT SERPL W P-5'-P-CCNC: 26 U/L (ref 10–36)
ANION GAP SERPL CALCULATED.3IONS-SCNC: 4 MMOL/L (ref 3.6–11.2)
AST SERPL-CCNC: 24 U/L (ref 10–30)
BASOPHILS # BLD AUTO: 0.09 10*3/MM3 (ref 0–0.3)
BASOPHILS NFR BLD AUTO: 2 % (ref 0–2)
BILIRUB SERPL-MCNC: 0.6 MG/DL (ref 0.2–1.8)
BUN BLD-MCNC: 19 MG/DL (ref 7–21)
BUN/CREAT SERPL: 18.1 (ref 7–25)
CALCIUM SPEC-SCNC: 9.5 MG/DL (ref 7.7–10)
CHLORIDE SERPL-SCNC: 108 MMOL/L (ref 99–112)
CHOLEST SERPL-MCNC: 152 MG/DL (ref 0–200)
CO2 SERPL-SCNC: 27 MMOL/L (ref 24.3–31.9)
CREAT BLD-MCNC: 1.05 MG/DL (ref 0.43–1.29)
DEPRECATED RDW RBC AUTO: 41.4 FL (ref 37–54)
EOSINOPHIL # BLD AUTO: 0.08 10*3/MM3 (ref 0–0.7)
EOSINOPHIL NFR BLD AUTO: 1.8 % (ref 0–5)
ERYTHROCYTE [DISTWIDTH] IN BLOOD BY AUTOMATED COUNT: 12.6 % (ref 11.5–14.5)
GFR SERPL CREATININE-BSD FRML MDRD: 55 ML/MIN/1.73
GLOBULIN UR ELPH-MCNC: 2.9 GM/DL
GLUCOSE BLD-MCNC: 91 MG/DL (ref 70–110)
HCT VFR BLD AUTO: 42.4 % (ref 37–47)
HDLC SERPL-MCNC: 59 MG/DL (ref 60–100)
HGB BLD-MCNC: 13.9 G/DL (ref 12–16)
IMM GRANULOCYTES # BLD AUTO: 0.01 10*3/MM3 (ref 0–0.03)
IMM GRANULOCYTES NFR BLD AUTO: 0.2 % (ref 0–0.5)
LDLC SERPL CALC-MCNC: 80 MG/DL (ref 0–100)
LDLC/HDLC SERPL: 1.36 {RATIO}
LYMPHOCYTES # BLD AUTO: 1.39 10*3/MM3 (ref 1–3)
LYMPHOCYTES NFR BLD AUTO: 31.5 % (ref 21–51)
MCH RBC QN AUTO: 29.8 PG (ref 27–33)
MCHC RBC AUTO-ENTMCNC: 32.8 G/DL (ref 33–37)
MCV RBC AUTO: 91 FL (ref 80–94)
MONOCYTES # BLD AUTO: 0.23 10*3/MM3 (ref 0.1–0.9)
MONOCYTES NFR BLD AUTO: 5.2 % (ref 0–10)
NEUTROPHILS # BLD AUTO: 2.61 10*3/MM3 (ref 1.4–6.5)
NEUTROPHILS NFR BLD AUTO: 59.3 % (ref 30–70)
OSMOLALITY SERPL CALC.SUM OF ELEC: 279.4 MOSM/KG (ref 273–305)
PLATELET # BLD AUTO: 219 10*3/MM3 (ref 130–400)
PMV BLD AUTO: 10.5 FL (ref 6–10)
POTASSIUM BLD-SCNC: 4.3 MMOL/L (ref 3.5–5.3)
PROT SERPL-MCNC: 7.2 G/DL (ref 6–8)
RBC # BLD AUTO: 4.66 10*6/MM3 (ref 4.2–5.4)
SODIUM BLD-SCNC: 139 MMOL/L (ref 135–153)
TRIGL SERPL-MCNC: 63 MG/DL (ref 0–150)
VLDLC SERPL-MCNC: 12.6 MG/DL
WBC NRBC COR # BLD: 4.41 10*3/MM3 (ref 4.5–12.5)

## 2019-02-27 PROCEDURE — 80061 LIPID PANEL: CPT

## 2019-02-27 PROCEDURE — 36415 COLL VENOUS BLD VENIPUNCTURE: CPT

## 2019-02-27 PROCEDURE — 80053 COMPREHEN METABOLIC PANEL: CPT

## 2019-02-27 PROCEDURE — 85025 COMPLETE CBC W/AUTO DIFF WBC: CPT

## 2019-07-26 ENCOUNTER — HOSPITAL ENCOUNTER (OUTPATIENT)
Dept: MAMMOGRAPHY | Facility: HOSPITAL | Age: 49
Discharge: HOME | End: 2019-07-26
Admitting: NURSE PRACTITIONER

## 2019-07-26 DIAGNOSIS — Z12.39 SCREENING BREAST EXAMINATION: ICD-10-CM

## 2019-07-26 PROCEDURE — 77063 BREAST TOMOSYNTHESIS BI: CPT

## 2019-07-26 PROCEDURE — 77067 SCR MAMMO BI INCL CAD: CPT | Performed by: RADIOLOGY

## 2019-07-26 PROCEDURE — 77067 SCR MAMMO BI INCL CAD: CPT

## 2019-07-26 PROCEDURE — 77063 BREAST TOMOSYNTHESIS BI: CPT | Performed by: RADIOLOGY

## 2019-08-21 ENCOUNTER — TRANSCRIBE ORDERS (OUTPATIENT)
Dept: OTHER | Facility: OTHER | Age: 49
End: 2019-08-21

## 2019-08-21 ENCOUNTER — LAB (OUTPATIENT)
Dept: LAB | Facility: HOSPITAL | Age: 49
End: 2019-08-21

## 2019-08-21 DIAGNOSIS — E03.9 MYXEDEMA HEART DISEASE: Primary | ICD-10-CM

## 2019-08-21 DIAGNOSIS — Z79.890 NEED FOR PROPHYLACTIC HORMONE REPLACEMENT THERAPY (POSTMENOPAUSAL): ICD-10-CM

## 2019-08-21 DIAGNOSIS — E55.9 VITAMIN D DEFICIENCY: ICD-10-CM

## 2019-08-21 DIAGNOSIS — I51.9 MYXEDEMA HEART DISEASE: Primary | ICD-10-CM

## 2019-08-21 DIAGNOSIS — I51.9 MYXEDEMA HEART DISEASE: ICD-10-CM

## 2019-08-21 DIAGNOSIS — E03.9 MYXEDEMA HEART DISEASE: ICD-10-CM

## 2019-08-21 PROCEDURE — 80053 COMPREHEN METABOLIC PANEL: CPT

## 2019-08-21 PROCEDURE — 83001 ASSAY OF GONADOTROPIN (FSH): CPT

## 2019-08-21 PROCEDURE — 84436 ASSAY OF TOTAL THYROXINE: CPT

## 2019-08-21 PROCEDURE — 84403 ASSAY OF TOTAL TESTOSTERONE: CPT

## 2019-08-21 PROCEDURE — 82626 DEHYDROEPIANDROSTERONE: CPT

## 2019-08-21 PROCEDURE — 84402 ASSAY OF FREE TESTOSTERONE: CPT

## 2019-08-21 PROCEDURE — 84144 ASSAY OF PROGESTERONE: CPT

## 2019-08-21 PROCEDURE — 84480 ASSAY TRIIODOTHYRONINE (T3): CPT

## 2019-08-21 PROCEDURE — 82306 VITAMIN D 25 HYDROXY: CPT

## 2019-08-21 PROCEDURE — 36415 COLL VENOUS BLD VENIPUNCTURE: CPT

## 2019-08-21 PROCEDURE — 82670 ASSAY OF TOTAL ESTRADIOL: CPT

## 2019-08-21 PROCEDURE — 82607 VITAMIN B-12: CPT

## 2019-08-21 PROCEDURE — 82746 ASSAY OF FOLIC ACID SERUM: CPT

## 2019-08-21 PROCEDURE — 84443 ASSAY THYROID STIM HORMONE: CPT

## 2019-08-22 LAB
25(OH)D3 SERPL-MCNC: 34.5 NG/ML (ref 30–100)
ALBUMIN SERPL-MCNC: 4.6 G/DL (ref 3.5–5.2)
ALBUMIN/GLOB SERPL: 1.7 G/DL
ALP SERPL-CCNC: 66 U/L (ref 39–117)
ALT SERPL W P-5'-P-CCNC: 13 U/L (ref 1–33)
ANION GAP SERPL CALCULATED.3IONS-SCNC: 8.8 MMOL/L (ref 5–15)
AST SERPL-CCNC: 17 U/L (ref 1–32)
BILIRUB SERPL-MCNC: 0.2 MG/DL (ref 0.2–1.2)
BUN BLD-MCNC: 24 MG/DL (ref 6–20)
BUN/CREAT SERPL: 21.4 (ref 7–25)
CALCIUM SPEC-SCNC: 9.4 MG/DL (ref 8.6–10.5)
CHLORIDE SERPL-SCNC: 100 MMOL/L (ref 98–107)
CO2 SERPL-SCNC: 23.2 MMOL/L (ref 22–29)
CREAT BLD-MCNC: 1.12 MG/DL (ref 0.57–1)
ESTRADIOL SERPL HS-MCNC: 111 PG/ML
FOLATE SERPL-MCNC: 16.9 NG/ML (ref 4.78–24.2)
FSH SERPL-ACNC: 31.9 MIU/ML
GFR SERPL CREATININE-BSD FRML MDRD: 51 ML/MIN/1.73
GLOBULIN UR ELPH-MCNC: 2.7 GM/DL
GLUCOSE BLD-MCNC: 80 MG/DL (ref 65–99)
POTASSIUM BLD-SCNC: 4.2 MMOL/L (ref 3.5–5.2)
PROGEST SERPL-MCNC: 0.44 NG/ML
PROT SERPL-MCNC: 7.3 G/DL (ref 6–8.5)
SODIUM BLD-SCNC: 132 MMOL/L (ref 136–145)
T3 SERPL-MCNC: 110 NG/DL (ref 80–200)
T4 SERPL-MCNC: 4.45 MCG/DL (ref 4.5–11.7)
TSH SERPL DL<=0.05 MIU/L-ACNC: 2.17 MIU/ML (ref 0.27–4.2)
VIT B12 BLD-MCNC: 501 PG/ML (ref 211–946)

## 2019-08-23 LAB
TESTOST FREE SERPL-MCNC: 2.3 PG/ML (ref 0–4.2)
TESTOST SERPL-MCNC: 91 NG/DL (ref 3–41)

## 2019-08-26 LAB — DHEA SERPL-MCNC: 169 NG/DL (ref 31–701)

## 2019-09-02 RX ORDER — ROSUVASTATIN CALCIUM 5 MG/1
5 TABLET, COATED ORAL DAILY
Qty: 30 TABLET | Refills: 5 | Status: CANCELLED | OUTPATIENT
Start: 2019-09-02

## 2020-05-14 ENCOUNTER — TRANSCRIBE ORDERS (OUTPATIENT)
Dept: ADMINISTRATIVE | Facility: HOSPITAL | Age: 50
End: 2020-05-14

## 2020-05-14 ENCOUNTER — LAB (OUTPATIENT)
Dept: LAB | Facility: HOSPITAL | Age: 50
End: 2020-05-14

## 2020-05-14 DIAGNOSIS — N95.1 MENOPAUSAL STATE: ICD-10-CM

## 2020-05-14 DIAGNOSIS — Z79.890 NEED FOR PROPHYLACTIC HORMONE REPLACEMENT THERAPY (POSTMENOPAUSAL): ICD-10-CM

## 2020-05-14 DIAGNOSIS — N95.1 MENOPAUSAL STATE: Primary | ICD-10-CM

## 2020-05-14 PROCEDURE — 82652 VIT D 1 25-DIHYDROXY: CPT

## 2020-05-14 PROCEDURE — 36415 COLL VENOUS BLD VENIPUNCTURE: CPT

## 2020-05-14 PROCEDURE — 84403 ASSAY OF TOTAL TESTOSTERONE: CPT

## 2020-05-14 PROCEDURE — 80053 COMPREHEN METABOLIC PANEL: CPT

## 2020-05-14 PROCEDURE — 82670 ASSAY OF TOTAL ESTRADIOL: CPT

## 2020-05-14 PROCEDURE — 83002 ASSAY OF GONADOTROPIN (LH): CPT

## 2020-05-14 PROCEDURE — 82626 DEHYDROEPIANDROSTERONE: CPT

## 2020-05-14 PROCEDURE — 84443 ASSAY THYROID STIM HORMONE: CPT

## 2020-05-14 PROCEDURE — 82607 VITAMIN B-12: CPT

## 2020-05-14 PROCEDURE — 82746 ASSAY OF FOLIC ACID SERUM: CPT

## 2020-05-14 PROCEDURE — 84402 ASSAY OF FREE TESTOSTERONE: CPT

## 2020-05-14 PROCEDURE — 84439 ASSAY OF FREE THYROXINE: CPT

## 2020-05-14 PROCEDURE — 84144 ASSAY OF PROGESTERONE: CPT

## 2020-05-14 PROCEDURE — 83001 ASSAY OF GONADOTROPIN (FSH): CPT

## 2020-05-15 LAB
ALBUMIN SERPL-MCNC: 4.2 G/DL (ref 3.5–5.2)
ALBUMIN/GLOB SERPL: 1.4 G/DL
ALP SERPL-CCNC: 70 U/L (ref 39–117)
ALT SERPL W P-5'-P-CCNC: 12 U/L (ref 1–33)
ANION GAP SERPL CALCULATED.3IONS-SCNC: 13.7 MMOL/L (ref 5–15)
AST SERPL-CCNC: 17 U/L (ref 1–32)
BILIRUB SERPL-MCNC: 0.2 MG/DL (ref 0.2–1.2)
BUN BLD-MCNC: 17 MG/DL (ref 6–20)
BUN/CREAT SERPL: 17.7 (ref 7–25)
CALCIUM SPEC-SCNC: 9.4 MG/DL (ref 8.6–10.5)
CHLORIDE SERPL-SCNC: 102 MMOL/L (ref 98–107)
CO2 SERPL-SCNC: 23.3 MMOL/L (ref 22–29)
CREAT BLD-MCNC: 0.96 MG/DL (ref 0.57–1)
ESTRADIOL SERPL HS-MCNC: 28.4 PG/ML
FOLATE SERPL-MCNC: >20 NG/ML (ref 4.78–24.2)
FSH SERPL-ACNC: 41.5 MIU/ML
GFR SERPL CREATININE-BSD FRML MDRD: 61 ML/MIN/1.73
GLOBULIN UR ELPH-MCNC: 3 GM/DL
GLUCOSE BLD-MCNC: 88 MG/DL (ref 65–99)
LH SERPL-ACNC: 41.6 MIU/ML
POTASSIUM BLD-SCNC: 4 MMOL/L (ref 3.5–5.2)
PROGEST SERPL-MCNC: 0.49 NG/ML
PROT SERPL-MCNC: 7.2 G/DL (ref 6–8.5)
SODIUM BLD-SCNC: 139 MMOL/L (ref 136–145)
T4 FREE SERPL-MCNC: 0.81 NG/DL (ref 0.93–1.7)
TSH SERPL DL<=0.05 MIU/L-ACNC: 1.87 UIU/ML (ref 0.27–4.2)
VIT B12 BLD-MCNC: 427 PG/ML (ref 211–946)

## 2020-05-17 LAB
DHEA SERPL-MCNC: 139 NG/DL (ref 31–701)
TESTOST FREE SERPL-MCNC: 1.6 PG/ML (ref 0–4.2)
TESTOST SERPL-MCNC: 40 NG/DL (ref 3–41)

## 2020-05-18 LAB — 1,25(OH)2D3 SERPL-MCNC: 49.7 PG/ML (ref 19.9–79.3)

## 2020-05-19 ENCOUNTER — TRANSCRIBE ORDERS (OUTPATIENT)
Dept: ADMINISTRATIVE | Facility: HOSPITAL | Age: 50
End: 2020-05-19

## 2020-05-19 ENCOUNTER — LAB (OUTPATIENT)
Dept: LAB | Facility: HOSPITAL | Age: 50
End: 2020-05-19

## 2020-05-19 DIAGNOSIS — Z79.890 HORMONE REPLACEMENT THERAPY: ICD-10-CM

## 2020-05-19 DIAGNOSIS — Z79.890 HORMONE REPLACEMENT THERAPY: Primary | ICD-10-CM

## 2020-05-19 LAB
6-ACETYL MORPHINE: NEGATIVE
AMPHET+METHAMPHET UR QL: NEGATIVE
BARBITURATES UR QL SCN: NEGATIVE
BENZODIAZ UR QL SCN: NEGATIVE
BUPRENORPHINE SERPL-MCNC: NEGATIVE NG/ML
CANNABINOIDS SERPL QL: NEGATIVE
COCAINE UR QL: NEGATIVE
METHADONE UR QL SCN: NEGATIVE
OPIATES UR QL: NEGATIVE
OXYCODONE UR QL SCN: NEGATIVE
PCP UR QL SCN: NEGATIVE

## 2020-05-19 PROCEDURE — 80307 DRUG TEST PRSMV CHEM ANLYZR: CPT

## 2020-11-06 ENCOUNTER — LAB (OUTPATIENT)
Dept: LAB | Facility: HOSPITAL | Age: 50
End: 2020-11-06

## 2020-11-06 ENCOUNTER — TRANSCRIBE ORDERS (OUTPATIENT)
Dept: ADMINISTRATIVE | Facility: HOSPITAL | Age: 50
End: 2020-11-06

## 2020-11-06 ENCOUNTER — HOSPITAL ENCOUNTER (OUTPATIENT)
Dept: MAMMOGRAPHY | Facility: HOSPITAL | Age: 50
Discharge: HOME | End: 2020-11-06
Admitting: NURSE PRACTITIONER

## 2020-11-06 DIAGNOSIS — E03.9 HYPOTHYROIDISM, UNSPECIFIED TYPE: Primary | ICD-10-CM

## 2020-11-06 DIAGNOSIS — E03.9 HYPOTHYROIDISM, UNSPECIFIED TYPE: ICD-10-CM

## 2020-11-06 DIAGNOSIS — Z12.31 VISIT FOR SCREENING MAMMOGRAM: ICD-10-CM

## 2020-11-06 PROCEDURE — 77063 BREAST TOMOSYNTHESIS BI: CPT | Performed by: RADIOLOGY

## 2020-11-06 PROCEDURE — 77063 BREAST TOMOSYNTHESIS BI: CPT

## 2020-11-06 PROCEDURE — 84439 ASSAY OF FREE THYROXINE: CPT

## 2020-11-06 PROCEDURE — 84443 ASSAY THYROID STIM HORMONE: CPT

## 2020-11-06 PROCEDURE — 36415 COLL VENOUS BLD VENIPUNCTURE: CPT

## 2020-11-06 PROCEDURE — 77067 SCR MAMMO BI INCL CAD: CPT | Performed by: RADIOLOGY

## 2020-11-06 PROCEDURE — 77067 SCR MAMMO BI INCL CAD: CPT

## 2020-11-06 PROCEDURE — 84481 FREE ASSAY (FT-3): CPT

## 2020-11-07 LAB
T3FREE SERPL-MCNC: 3.34 PG/ML (ref 2–4.4)
T4 FREE SERPL-MCNC: 0.76 NG/DL (ref 0.93–1.7)
TSH SERPL DL<=0.05 MIU/L-ACNC: 1.93 UIU/ML (ref 0.27–4.2)

## 2020-12-21 ENCOUNTER — IMMUNIZATION (OUTPATIENT)
Dept: VACCINE CLINIC | Facility: HOSPITAL | Age: 50
End: 2020-12-21

## 2020-12-21 PROCEDURE — 0001A: CPT | Performed by: FAMILY MEDICINE

## 2020-12-21 PROCEDURE — 91300 HC SARSCOV02 VAC 30MCG/0.3ML IM: CPT | Performed by: FAMILY MEDICINE

## 2021-01-11 ENCOUNTER — APPOINTMENT (OUTPATIENT)
Dept: VACCINE CLINIC | Facility: HOSPITAL | Age: 51
End: 2021-01-11

## 2021-01-13 ENCOUNTER — IMMUNIZATION (OUTPATIENT)
Dept: VACCINE CLINIC | Facility: HOSPITAL | Age: 51
End: 2021-01-13

## 2021-01-13 PROCEDURE — 91300 HC SARSCOV02 VAC 30MCG/0.3ML IM: CPT | Performed by: FAMILY MEDICINE

## 2021-01-13 PROCEDURE — 0001A: CPT | Performed by: FAMILY MEDICINE

## 2021-01-13 PROCEDURE — 0002A: CPT | Performed by: FAMILY MEDICINE

## 2021-07-08 ENCOUNTER — TRANSCRIBE ORDERS (OUTPATIENT)
Dept: OTHER | Facility: OTHER | Age: 51
End: 2021-07-08

## 2021-07-08 ENCOUNTER — LAB (OUTPATIENT)
Dept: LAB | Facility: HOSPITAL | Age: 51
End: 2021-07-08

## 2021-07-08 DIAGNOSIS — E03.9 MYXEDEMA HEART DISEASE: ICD-10-CM

## 2021-07-08 DIAGNOSIS — I51.9 MYXEDEMA HEART DISEASE: ICD-10-CM

## 2021-07-08 DIAGNOSIS — Z86.39 PERSONAL HISTORY OF NUTRITIONAL DEFICIENCY: Primary | ICD-10-CM

## 2021-07-08 DIAGNOSIS — Z86.39 PERSONAL HISTORY OF NUTRITIONAL DEFICIENCY: ICD-10-CM

## 2021-07-08 LAB
ALBUMIN SERPL-MCNC: 4.2 G/DL (ref 3.5–5.2)
ALBUMIN/GLOB SERPL: 1.8 G/DL
ALP SERPL-CCNC: 60 U/L (ref 39–117)
ALT SERPL W P-5'-P-CCNC: 14 U/L (ref 1–33)
ANION GAP SERPL CALCULATED.3IONS-SCNC: 8.6 MMOL/L (ref 5–15)
AST SERPL-CCNC: 16 U/L (ref 1–32)
BASOPHILS # BLD AUTO: 0.1 10*3/MM3 (ref 0–0.2)
BASOPHILS NFR BLD AUTO: 2.2 % (ref 0–1.5)
BILIRUB SERPL-MCNC: 0.4 MG/DL (ref 0–1.2)
BUN SERPL-MCNC: 13 MG/DL (ref 6–20)
BUN/CREAT SERPL: 13.1 (ref 7–25)
CALCIUM SPEC-SCNC: 8.7 MG/DL (ref 8.6–10.5)
CHLORIDE SERPL-SCNC: 104 MMOL/L (ref 98–107)
CHOLEST SERPL-MCNC: 164 MG/DL (ref 0–200)
CO2 SERPL-SCNC: 26.4 MMOL/L (ref 22–29)
CREAT SERPL-MCNC: 0.99 MG/DL (ref 0.57–1)
DEPRECATED RDW RBC AUTO: 41.2 FL (ref 37–54)
EOSINOPHIL # BLD AUTO: 0.13 10*3/MM3 (ref 0–0.4)
EOSINOPHIL NFR BLD AUTO: 2.9 % (ref 0.3–6.2)
ERYTHROCYTE [DISTWIDTH] IN BLOOD BY AUTOMATED COUNT: 12 % (ref 12.3–15.4)
GFR SERPL CREATININE-BSD FRML MDRD: 59 ML/MIN/1.73
GLOBULIN UR ELPH-MCNC: 2.3 GM/DL
GLUCOSE SERPL-MCNC: 84 MG/DL (ref 65–99)
HCT VFR BLD AUTO: 41.4 % (ref 34–46.6)
HDLC SERPL-MCNC: 49 MG/DL (ref 40–60)
HGB BLD-MCNC: 13.5 G/DL (ref 12–15.9)
IMM GRANULOCYTES # BLD AUTO: 0.01 10*3/MM3 (ref 0–0.05)
IMM GRANULOCYTES NFR BLD AUTO: 0.2 % (ref 0–0.5)
LDLC SERPL CALC-MCNC: 99 MG/DL (ref 0–100)
LDLC/HDLC SERPL: 2.01 {RATIO}
LYMPHOCYTES # BLD AUTO: 1.38 10*3/MM3 (ref 0.7–3.1)
LYMPHOCYTES NFR BLD AUTO: 30.9 % (ref 19.6–45.3)
MCH RBC QN AUTO: 30.4 PG (ref 26.6–33)
MCHC RBC AUTO-ENTMCNC: 32.6 G/DL (ref 31.5–35.7)
MCV RBC AUTO: 93.2 FL (ref 79–97)
MONOCYTES # BLD AUTO: 0.27 10*3/MM3 (ref 0.1–0.9)
MONOCYTES NFR BLD AUTO: 6.1 % (ref 5–12)
NEUTROPHILS NFR BLD AUTO: 2.57 10*3/MM3 (ref 1.7–7)
NEUTROPHILS NFR BLD AUTO: 57.7 % (ref 42.7–76)
NRBC BLD AUTO-RTO: 0 /100 WBC (ref 0–0.2)
PLATELET # BLD AUTO: 219 10*3/MM3 (ref 140–450)
PMV BLD AUTO: 10.9 FL (ref 6–12)
POTASSIUM SERPL-SCNC: 4.3 MMOL/L (ref 3.5–5.2)
PROT SERPL-MCNC: 6.5 G/DL (ref 6–8.5)
RBC # BLD AUTO: 4.44 10*6/MM3 (ref 3.77–5.28)
SODIUM SERPL-SCNC: 139 MMOL/L (ref 136–145)
T3FREE SERPL-MCNC: 2.81 PG/ML (ref 2–4.4)
T4 FREE SERPL-MCNC: 0.86 NG/DL (ref 0.93–1.7)
TRIGL SERPL-MCNC: 83 MG/DL (ref 0–150)
TSH SERPL DL<=0.05 MIU/L-ACNC: 2.04 UIU/ML (ref 0.27–4.2)
VLDLC SERPL-MCNC: 16 MG/DL (ref 5–40)
WBC # BLD AUTO: 4.46 10*3/MM3 (ref 3.4–10.8)

## 2021-07-08 PROCEDURE — 80053 COMPREHEN METABOLIC PANEL: CPT

## 2021-07-08 PROCEDURE — 84443 ASSAY THYROID STIM HORMONE: CPT

## 2021-07-08 PROCEDURE — 84439 ASSAY OF FREE THYROXINE: CPT

## 2021-07-08 PROCEDURE — 80061 LIPID PANEL: CPT

## 2021-07-08 PROCEDURE — 36415 COLL VENOUS BLD VENIPUNCTURE: CPT

## 2021-07-08 PROCEDURE — 85025 COMPLETE CBC W/AUTO DIFF WBC: CPT

## 2021-07-08 PROCEDURE — 84481 FREE ASSAY (FT-3): CPT

## 2021-10-19 ENCOUNTER — IMMUNIZATION (OUTPATIENT)
Dept: VACCINE CLINIC | Facility: HOSPITAL | Age: 51
End: 2021-10-19

## 2021-10-19 PROCEDURE — 0004A ADM SARSCOV2 30MCG/0.3ML BOOSTER: CPT | Performed by: INTERNAL MEDICINE

## 2021-10-19 PROCEDURE — 91300 HC SARSCOV02 VAC 30MCG/0.3ML IM: CPT | Performed by: INTERNAL MEDICINE

## 2022-06-09 ENCOUNTER — HOSPITAL ENCOUNTER (OUTPATIENT)
Dept: MAMMOGRAPHY | Facility: HOSPITAL | Age: 52
Discharge: HOME | End: 2022-06-09
Admitting: NURSE PRACTITIONER

## 2022-06-09 DIAGNOSIS — Z12.31 VISIT FOR SCREENING MAMMOGRAM: ICD-10-CM

## 2022-06-09 PROCEDURE — 77067 SCR MAMMO BI INCL CAD: CPT | Performed by: RADIOLOGY

## 2022-06-09 PROCEDURE — 77063 BREAST TOMOSYNTHESIS BI: CPT | Performed by: RADIOLOGY

## 2022-06-09 PROCEDURE — 77067 SCR MAMMO BI INCL CAD: CPT

## 2022-06-09 PROCEDURE — 77063 BREAST TOMOSYNTHESIS BI: CPT

## 2022-06-10 ENCOUNTER — APPOINTMENT (OUTPATIENT)
Dept: MAMMOGRAPHY | Facility: HOSPITAL | Age: 52
End: 2022-06-10

## 2022-09-12 ENCOUNTER — DISEASE STATE MANAGEMENT VISIT (OUTPATIENT)
Dept: PHARMACY | Facility: HOSPITAL | Age: 52
End: 2022-09-12

## 2022-09-12 ENCOUNTER — SPECIALTY PHARMACY (OUTPATIENT)
Dept: PHARMACY | Facility: HOSPITAL | Age: 52
End: 2022-09-12

## 2022-09-12 VITALS
SYSTOLIC BLOOD PRESSURE: 124 MMHG | DIASTOLIC BLOOD PRESSURE: 86 MMHG | BODY MASS INDEX: 32.66 KG/M2 | HEART RATE: 68 BPM | WEIGHT: 184.4 LBS

## 2022-09-12 RX ORDER — TIRZEPATIDE 2.5 MG/.5ML
2.5 INJECTION, SOLUTION SUBCUTANEOUS WEEKLY
Qty: 2 ML | Refills: 0 | Status: SHIPPED | OUTPATIENT
Start: 2022-09-12 | End: 2022-09-13

## 2022-09-12 RX ORDER — FEXOFENADINE HCL 180 MG/1
180 TABLET ORAL DAILY
COMMUNITY

## 2022-09-12 NOTE — PROGRESS NOTES
[facility]  Weight Management Program        Kristy Iqbal is a 52 y.o. female referred to the [facility] by Ce Parra for clinical pharmacy and specialty pharmacy management of [facility] for weight management. Kristy Iqbal has previously tried gym, low carb diet, Noom, and [employer] for weight loss. Patient is not currently doing anything for weight loss. The patient denies a personal history or family history of thyroid cancer, denies a personal history of pancreatitis, and denies a diagnosis of gastroparesis.     Relevant Past Medical History and Co-morbidities  Past Medical History:   Diagnosis Date   • Heart murmur     questionable, hx scarlet fever as child   • Thyroid disease      Social History     Socioeconomic History   • Marital status:    Tobacco Use   • Smoking status: Never Smoker   • Smokeless tobacco: Never Used   Substance and Sexual Activity   • Alcohol use: No   • Drug use: No       Allergies  Ampicillin, Codeine, Penicillins, Sulfa antibiotics, Trimethoprim, and Cefdinir    Current Medication List    Current Outpatient Medications:   •  fexofenadine (ALLEGRA) 180 MG tablet, Take 180 mg by mouth Daily., Disp: , Rfl:   •  hepatitis A (HAVRIX) 1440 EL U/ML vaccine, Inject 1 mL into the appropriate muscle as directed by prescriber., Disp: 1 mL, Rfl: 1  •  NON FORMULARY, Estradiol 250 mg, progesterone 8 g, testosterone 300 mg drops compounded at [facility] (6 drops at night), Disp: , Rfl:   •  spironolactone (ALDACTONE) 50 MG tablet, Take 1 tablet by mouth 2 (Two) Times a Day., Disp: 60 tablet, Rfl: 5  •  Thyroid ([place] Thyroid) 60 MG tablet, Take 1 tablet by mouth Daily., Disp: 30 tablet, Rfl: 6  •  Thyroid ([place] Thyroid) 60 MG tablet, Take 1 tablet by mouth Daily., Disp: 30 tablet, Rfl: 6  •  Tirzepatide (Mounjaro) 2.5 MG/0.5ML solution pen-injector, Inject 2.5 mg under the skin into the appropriate area as directed 1 (One) Time Per  Week., Disp: 2 mL, Rfl: 0    Drug Interactions  Mounjaro + Estradiol + Progesterone: hyperglycemia-Associated Agents may diminish the therapeutic effect of Antidiabetic Agents  Mounjaro + Testosterone: Androgens may enhance the hypoglycemic effect of Agents with Blood Glucose Lowering Effects    Relevant Laboratory Values  Lab Results   Component Value Date    CHOL 164 07/08/2021    CHLPL 216 (H) 02/01/2016    TRIG 83 07/08/2021    HDL 49 07/08/2021    LDL 99 07/08/2021     There is no height or weight on file to calculate BMI.    Vaccinations:   Patient recommended to keep up with routine vaccinations.     Goals of Therapy  • Clinical Goals or Therapeutic Targets: 10% weight loss goal      Date 9/12/22       Weight (lb) 184.4       BMI kg/ 32.66       Waist Circumference (in)  40.5 in           Medication Assessment & Plan    Patient's current BMI is 32.66, which is considered Class I Obesity.    Will order Mounjaro 2.5mg SC weekly. None of the patient's medications need adjusted based on the addition of Mounjaro to medication list. Mounjaro may increase risk of hypoglycemia with the hormone drops, educated on monitoring for signs/symptoms of low blood sugar and how to correct. Patient education provided on Mounjaro administration technique and patient seemed to have a good understanding of counseling.     Informed patient that Mounjaro is off-label use for weight loss. Patient agreeable to starting. Patient denies currently being pregnancy/breast feeding, denies planning to become pregnant, and denies current use of oral contraceptives.     Discussed lifestyle modifications, including diet and exercise. Patient education provided.     Worked with patient to create SMART Goal(s): drink more water every day (64 oz)    Will follow-up with patient in 4 weeks, or sooner if needed.       Medication Education Provided  Mounjaro® (Tirzepatide)     Mounjaro is currently FDA approved for the treatment of type 2 diabetes,  using tirzepatide for weight loss is considered “off-label”     Medication Expectations   Why am I taking this medication? You are taking Mounjaro for weight loss because you have excess weight and also have weight-related medical problems or obesity. It should be used along with a reduced calorie diet and increased physical activity.    What should I expect while on this medication? You should expect to lose approximately 15%-20% of body weight   How does the medication work? Mounjaro is an injection that addresses one of your body's natural responses to weight loss. It works like naturally produced hormones in the body called GLP-1 and GIP that regulates appetite which can lead to eating fewer calories and losing weight. GIP complements the effects of the GLP-1 increasing energy expenditure leading to weight loss. This medication also slows down food from leaving your stomach, making you feel boggs for longer.   How long will I be on this medication for? The amount of time you will be on this medication will be determined by your doctor. Do not abruptly stop this medication without talking to your doctor first.    How do I take this medication? Take as directed on your prescription label. Mounjaro is injected under the skin (subcutaneously) of your stomach, thigh, or upper arm. This medication should be taken once weekly and can be given with or without food.     For each new prefilled pen, pull off the base cap on the pen. Place the base flat on the skin, then unlock. Press and hold the button for up to 10 seconds. Listen for the first click. It means the injection has started. The second click means that the injection is complete. Remove pen from skin and discard in a sharps container.     What are some possible side effects? You may notice you don't feel as hungry, especially when you first start using Mounjaro. Some common side effects include nausea, vomiting, diarrhea, vomiting, indigestion, constipation,  and stomach (abdominal) pain. Redness, itching, and/or swelling can occur where the shot was given. You should also monitor for low blood sugar (hypoglycemia), especially if you are taking Mounjaro with other medications that cause low blood sugar. Stop using Mounjaro and call your doctor immediately if you have severe pain in your stomach area that will not go away as this could be a sign of pancreatitis (inflammation of your pancreas). Gallbladder problems have happened in some people who use Mounjaro. Tell your healthcare provider right away if you get symptoms of gallbladder problems, which may include pain in your upper stomach (abdomen), fever, yellowing of skin or eyes (jaundice), and jordi-colored stools.    What happens if I miss a dose? Missed dose should be administered as soon as possible within 4 days; resume usual schedule thereafter. If >4 days have elapsed, skip the missed dose and resume administration at the next scheduled weekly dose.          Medication Safety   What are things I should warn my doctor immediately about? Do not use Mounjaro if you or a family member have ever had medullary thyroid cancer (MTC) or Multiple Endocrine Neoplasia syndrome type 2 (MEN 2). Tell your doctor if you get a lump or swelling in your neck, hoarseness, difficulty swallowing, or feel short of breath (these may be symptoms of thyroid cancer). Talk to your doctor if you have or have had problems with your pancreas, kidneys, or liver. Tell your doctor if you have problems with digesting food or slowed emptying of your stomach (gastroparesis). Also tell your doctor if you notice any signs/symptoms of an allergic reaction (rash, hives, difficulty breathing, etc.).   What are things that I should be cautious of? Be cautious of any side effects from this medication. Talk to your doctor if any new ones develop or aren't getting better.   What are some medications that can interact with this one? Taking Mounjaro with other  medications that lower your blood sugar such as insulins and glipizide/glimepiride/glyburide may increase the risk of low blood sugar. It should not be taken with other medicines called GLP-1 receptor agonists, as these work the same way as Mounjaro. Because Mounjaro slows stomach emptying, it can affect the way some medicines work. Always tell your doctor or pharmacist immediately if you start taking any new medications, including over-the-counter medications, vitamins, and herbal supplements.      Resources/Support   How can I remind myself to take this medication? You can download reminder apps to help you manage your refills. You may also set an alarm on your phone to remind you.    Is financial support available?  [employer], the drug , can provide co-pay cards if you have commercial insurance.    Which vaccines are recommended for me? Talk to your doctor about these vaccines: Flu, Coronavirus (COVID-19), Pneumococcal (pneumonia), Tdap, Zoster (shingles).     Medication Storage/Handling   How should I handle this medication? Keep this medication out of reach of pets/children and keep the pen capped when not in use. Do not share your medicine pens with others.    How does this medication need to be stored? Store your new, unused pens in the original carton in the refrigerator. Protect it from light. Do not freeze. If needed, each single-dose pen can be stored unrefrigerated at temperatures for up to 21 days.   How should I dispose of this medication? Used Mounjaro pens should be thrown away after use. Place your used pen in an approved sharps container. If you do not have a sharps container, you may use a household container made of heavy-duty plastic with a tight-fitting lid that is leak resistant (e.g., heavy-duty plastic laundry detergent bottle). If your doctor decides to stop this medication, take to your local police station for proper disposal. Some pharmacies also have take-back bins for  medication drop-off.       Females of Childbearing Age   Is there anything additionally I should know as a female of childbearing age?  Talk to your doctor if you are pregnant, planning to become pregnant, or breastfeeding. You should not take this medication for weight loss if you are pregnant, planning to become pregnant or breastfeeding.     Tirzepatide may decrease the efficacy of oral hormonal contraception due to changes in gastric emptying; contraceptives administered by a non-oral route are not affected. Because the changes in gastric emptying are largest following the first dose, patients using an oral contraceptive should add a barrier method after starting tirzepatide treatment. Alternately, patients can be switched to non-oral contraceptive methods.        Yudy Parra, Pharmacy Intern  Edwige Jackson, [facility]  9/12/2022  12:22 EDT

## 2022-09-12 NOTE — PROGRESS NOTES
[facility]  Weight Management Program        Kristy Iqbal is a 52 y.o. female referred to the [facility] by Ce Parra for clinical pharmacy and specialty pharmacy management of [facility] for weight management. Kristy Iqbal has previously tried gym, low carb diet, Noom, and Metabalife for weight loss. Patient is not currently doing anything for weight loss. The patient denies a personal history or family history of thyroid cancer, denies a personal history of pancreatitis, and denies a diagnosis of gastroparesis.     Relevant Past Medical History and Co-morbidities  Past Medical History:   Diagnosis Date   • Heart murmur     questionable, hx scarlet fever as child   • Thyroid disease      Social History     Socioeconomic History   • Marital status:    Tobacco Use   • Smoking status: Never Smoker   • Smokeless tobacco: Never Used   Substance and Sexual Activity   • Alcohol use: No   • Drug use: No       Allergies  Ampicillin, Codeine, Penicillins, Sulfa antibiotics, Trimethoprim, and Cefdinir    Current Medication List    Current Outpatient Medications:   •  fexofenadine (ALLEGRA) 180 MG tablet, Take 180 mg by mouth Daily., Disp: , Rfl:   •  NON FORMULARY, Estradiol 250 mg, progesterone 8 g, [place] 300 [place] [facility] [place]), Disp: , Rfl:   •  spironolactone (ALDACTONE) 50 MG tablet, Take 1 tablet by mouth 2 (Two) Times a Day., Disp: 60 tablet, Rfl: 5  •  Thyroid (Daphne Thyroid) 60 MG tablet, Take 1 tablet by mouth Daily., Disp: 30 tablet, Rfl: 6  •  Thyroid (Daphne Thyroid) 60 MG tablet, Take 1 tablet by mouth Daily., Disp: 30 tablet, Rfl: 6  •  hepatitis A (HAVRIX) 1440 EL U/ML vaccine, Inject 1 mL into the appropriate muscle as directed by prescriber., Disp: 1 mL, Rfl: 1  •  Tirzepatide (Mounjaro) 2.5 MG/0.5ML solution pen-injector, Inject 2.5 mg under the skin into the appropriate area as directed 1 (One) Time Per  Week., Disp: 2 mL, Rfl: 0    Drug Interactions  Mounjaro + Estradiol + Progesterone: hyperglycemia-Associated Agents may diminish the therapeutic effect of Antidiabetic Agents  Mounjaro + Testosterone: Androgens may enhance the hypoglycemic effect of Agents with Blood Glucose Lowering Effects    Relevant Laboratory Values  Lab Results   Component Value Date    CHOL 164 07/08/2021    CHLPL 216 (H) 02/01/2016    TRIG 83 07/08/2021    HDL 49 07/08/2021    LDL 99 07/08/2021     Body mass index is 32.66 kg/m².    Vaccinations:   Patient recommended to keep up with routine vaccinations.     Goals of Therapy  • Clinical Goals or Therapeutic Targets: 10% weight loss goal      Date 9/12/22       Weight (lb) 184.4       BMI kg/ 32.66       Waist Circumference (in)  40.5 in           Medication Assessment & Plan    Patient's current BMI is 32.66, which is considered Class I Obesity.    Will order Mounjaro 2.5mg SC weekly. None of the patient's medications need adjusted based on the addition of Mounjaro to medication list. Mounjaro may increase risk of hypoglycemia with the hormone drops, educated on monitoring for signs/symptoms of low blood sugar and how to correct. Patient education provided on Mounjaro administration technique and patient seemed to have a good understanding of counseling.     Informed patient that Mounjaro is off-label use for weight loss. Patient agreeable to starting. Patient denies currently being pregnancy/breast feeding, denies planning to become pregnant, and denies current use of oral contraceptives.     Discussed lifestyle modifications, including diet and exercise. Patient education provided.     Worked with patient to create SMART Goal(s): drink more water every day (64 oz)    Will follow-up with patient in 4 weeks, or sooner if needed.       Medication Education Provided  Mounjaro® (Tirzepatide)     Mounjaro is currently FDA approved for the treatment of type 2 diabetes, using tirzepatide for  weight loss is considered “off-label”     Medication Expectations   Why am I taking this medication? You are taking Mounjaro for weight loss because you have excess weight and also have weight-related medical problems or obesity. It should be used along with a reduced calorie diet and increased physical activity.    What should I expect while on this medication? You should expect to lose approximately 15%-20% of body weight   How does the medication work? Mounjaro is an injection that addresses one of your body's natural responses to weight loss. It works like naturally produced hormones in the body called GLP-1 and GIP that regulates appetite which can lead to eating fewer calories and losing weight. GIP complements the effects of the GLP-1 increasing energy expenditure leading to weight loss. This medication also slows down food from leaving your stomach, making you feel boggs for longer.   How long will I be on this medication for? The amount of time you will be on this medication will be determined by your doctor. Do not abruptly stop this medication without talking to your doctor first.    How do I take this medication? Take as directed on your prescription label. Mounjaro is injected under the skin (subcutaneously) of your stomach, thigh, or upper arm. This medication should be taken once weekly and can be given with or without food.     For each new prefilled pen, pull off the base cap on the pen. Place the base flat on the skin, then unlock. Press and hold the button for up to 10 seconds. Listen for the first click. It means the injection has started. The second click means that the injection is complete. Remove pen from skin and discard in a sharps container.     What are some possible side effects? You may notice you don't feel as hungry, especially when you first start using Mounjaro. Some common side effects include nausea, vomiting, diarrhea, vomiting, indigestion, constipation, and stomach (abdominal)  pain. Redness, itching, and/or swelling can occur where the shot was given. You should also monitor for low blood sugar (hypoglycemia), especially if you are taking Mounjaro with other medications that cause low blood sugar. Stop using Mounjaro and call your doctor immediately if you have severe pain in your stomach area that will not go away as this could be a sign of pancreatitis (inflammation of your pancreas). Gallbladder problems have happened in some people who use Mounjaro. Tell your healthcare provider right away if you get symptoms of gallbladder problems, which may include pain in your upper stomach (abdomen), fever, yellowing of skin or eyes (jaundice), and jordi-colored stools.    What happens if I miss a dose? Missed dose should be administered as soon as possible within 4 days; resume usual schedule thereafter. If >4 days have elapsed, skip the missed dose and resume administration at the next scheduled weekly dose.          Medication Safety   What are things I should warn my doctor immediately about? Do not use Mounjaro if you or a family member have ever had medullary thyroid cancer (MTC) or Multiple Endocrine Neoplasia syndrome type 2 (MEN 2). Tell your doctor if you get a lump or swelling in your neck, hoarseness, difficulty swallowing, or feel short of breath (these may be symptoms of thyroid cancer). Talk to your doctor if you have or have had problems with your pancreas, kidneys, or liver. Tell your doctor if you have problems with digesting food or slowed emptying of your stomach (gastroparesis). Also tell your doctor if you notice any signs/symptoms of an allergic reaction (rash, hives, difficulty breathing, etc.).   What are things that I should be cautious of? Be cautious of any side effects from this medication. Talk to your doctor if any new ones develop or aren't getting better.   What are some medications that can interact with this one? Taking Mounjaro with other medications that lower  your blood sugar such as insulins and glipizide/glimepiride/glyburide may increase the risk of low blood sugar. It should not be taken with other medicines called GLP-1 receptor agonists, as these work the same way as Mounjaro. Because Mounjaro slows stomach emptying, it can affect the way some medicines work. Always tell your doctor or pharmacist immediately if you start taking any new medications, including over-the-counter medications, vitamins, and herbal supplements.      Resources/Support   How can I remind myself to take this medication? You can download reminder apps to help you manage your refills. You may also set an alarm on your phone to remind you.    Is financial support available?  [employer], the drug , can provide co-pay cards if you have commercial insurance.    Which vaccines are recommended for me? Talk to your doctor about these vaccines: Flu, Coronavirus (COVID-19), Pneumococcal (pneumonia), Tdap, Zoster (shingles).     Medication Storage/Handling   How should I handle this medication? Keep this medication out of reach of pets/children and keep the pen capped when not in use. Do not share your medicine pens with others.    How does this medication need to be stored? Store your new, unused pens in the original carton in the refrigerator. Protect it from light. Do not freeze. If needed, each single-dose pen can be stored unrefrigerated at temperatures for up to 21 days.   How should I dispose of this medication? Used Mounjaro pens should be thrown away after use. Place your used pen in an approved sharps container. If you do not have a sharps container, you may use a household container made of heavy-duty plastic with a tight-fitting lid that is leak resistant (e.g., heavy-duty plastic laundry detergent bottle). If your doctor decides to stop this medication, take to your local police station for proper disposal. Some pharmacies also have take-back bins for medication drop-off.        Females of Childbearing Age   Is there anything additionally I should know as a female of childbearing age?  Talk to your doctor if you are pregnant, planning to become pregnant, or breastfeeding. You should not take this medication for weight loss if you are pregnant, planning to become pregnant or breastfeeding.     Tirzepatide may decrease the efficacy of oral hormonal contraception due to changes in gastric emptying; contraceptives administered by a non-oral route are not affected. Because the changes in gastric emptying are largest following the first dose, patients using an oral contraceptive should add a barrier method after starting tirzepatide treatment. Alternately, patients can be switched to non-oral contraceptive methods.        Yudy Parra, Pharmacy Intern  Edwige Jackson, [facility]  9/12/2022  12:21 EDT

## 2022-09-13 RX ORDER — TIRZEPATIDE 2.5 MG/.5ML
2.5 INJECTION, SOLUTION SUBCUTANEOUS WEEKLY
Qty: 2 ML | Refills: 0
Start: 2022-09-13 | End: 2022-10-10

## 2022-10-10 ENCOUNTER — SPECIALTY PHARMACY (OUTPATIENT)
Dept: PHARMACY | Facility: HOSPITAL | Age: 52
End: 2022-10-10

## 2022-10-10 ENCOUNTER — DISEASE STATE MANAGEMENT VISIT (OUTPATIENT)
Dept: PHARMACY | Facility: HOSPITAL | Age: 52
End: 2022-10-10

## 2022-10-10 RX ORDER — LIRAGLUTIDE 6 MG/ML
INJECTION, SOLUTION SUBCUTANEOUS
Qty: 15 ML | Refills: 0 | Status: SHIPPED | OUTPATIENT
Start: 2022-10-10 | End: 2022-11-07

## 2022-10-10 RX ORDER — PEN NEEDLE, DIABETIC 30 GX3/16"
1 NEEDLE, DISPOSABLE MISCELLANEOUS DAILY
Qty: 100 EACH | Refills: 0 | Status: SHIPPED | OUTPATIENT
Start: 2022-10-10 | End: 2023-01-31

## 2022-10-10 NOTE — PROGRESS NOTES
Medication Management Clinic  Weight Management Program        Kristy Iqbal is a 52 y.o. female referred to the Medication Management Clinic by Ce Parra for clinical pharmacy and specialty pharmacy management of Mounjaro for weight management. Kristy Iqbal has previously tried gym, low carb diet, Noom, and Metabalife for weight loss. Patient is currently taking Mounjaro 2.5 mg doing anything for weight loss. The patient denies a personal history or family history of thyroid cancer, denies a personal history of pancreatitis, and denies a diagnosis of gastroparesis. Pt reports she has been tolerating Mounjaro well with minimal nausea, denies any missed doses or trouble injecting the medication.     Relevant Past Medical History and Co-morbidities  Past Medical History:   Diagnosis Date   • Heart murmur     questionable, hx scarlet fever as child   • Thyroid disease      Social History     Socioeconomic History   • Marital status:    Tobacco Use   • Smoking status: Never   • Smokeless tobacco: Never   Substance and Sexual Activity   • Alcohol use: No   • Drug use: No       Allergies  Ampicillin, Codeine, Penicillins, Sulfa antibiotics, Trimethoprim, and Cefdinir    Current Medication List    Current Outpatient Medications:   •  fexofenadine (ALLEGRA) 180 MG tablet, Take 180 mg by mouth Daily., Disp: , Rfl:   •  hepatitis A (HAVRIX) 1440 EL U/ML vaccine, Inject 1 mL into the appropriate muscle as directed by prescriber., Disp: 1 mL, Rfl: 1  •  NON FORMULARY, Estradiol 250 mg, progesterone 8 g, testosterone 300 mg drops compounded at [facility] (6 drops at night), Disp: , Rfl:   •  spironolactone (ALDACTONE) 50 MG tablet, Take 1 tablet by mouth 2 (Two) Times a Day., Disp: 60 tablet, Rfl: 5  •  Thyroid (Colbert Thyroid) 60 MG tablet, Take 1 tablet by mouth Daily., Disp: 30 tablet, Rfl: 6  •  Thyroid (Colbert Thyroid) 60 MG tablet, Take 1 tablet by mouth Daily., Disp: 30 tablet, Rfl: 6  •   "Tirzepatide (Mounjaro) 2.5 MG/0.5ML solution pen-injector, Inject 2.5 mg under the skin into the appropriate area as directed 1 (One) Time Per Week., Disp: 2 mL, Rfl: 0    Drug Interactions  Saxenda + Estradiol + Progesterone: hyperglycemia-Associated Agents may diminish the therapeutic effect of Antidiabetic Agents  Saxenda + Testosterone: Androgens may enhance the hypoglycemic effect of Agents with Blood Glucose Lowering Effects    Relevant Laboratory Values  Lab Results   Component Value Date    CHOL 164 07/08/2021    CHLPL 216 (H) 02/01/2016    TRIG 83 07/08/2021    HDL 49 07/08/2021    LDL 99 07/08/2021     There is no height or weight on file to calculate BMI.    Vaccinations:   Patient recommended to keep up with routine vaccinations.     Goals of Therapy  • Clinical Goals or Therapeutic Targets: 10% weight loss goal      Date 9/12/22 10/10/22      Weight (lb) 184.4 179.6      BMI kg/ 32.66 31.81      Waist Circumference (in)  40.5 in 39.25\"          Medication Assessment & Plan    Patient's current BMI is 31.81, which is considered Class I Obesity. Pt has lost 4.8 lbs since last visit and 1.25\" off waist. Patient congratulated on success.    Due to changes in Mounjaro availability for patients that to do not have Type 2 Diabetes, Will order Saxenda 0.6 mg SC daily x 1 week, then increase to 1.2mg SC daily x 1 week, then increase to 1.8mg SC daily x 1 week, then increase to 2.4mg SC daily x 1 week, then increase to 3mg SC daily. If at anytime the patient cannot tolerate an increased dose during dose escalation, instructed Pt to delay dose escalation for 1 week and call clinic.     None of the patient's medications need adjusted based on the addition of Saxenda to medication list. Saxenda may increase risk of hypoglycemia with the hormone drops, educated on monitoring for signs/symptoms of low blood sugar and how to correct. Patient education provided on Saxenda administration technique and patient seemed to " have a good understanding of counseling. Pt denied any s/sx of hypoglycemia with previous administrations of Mounjaro.       Discussed lifestyle modifications, including diet and exercise. Patient education provided.     Worked with patient to create SMART Goal(s): drink more water every day (64 oz). Pt wishes to continue this goal.     Will follow-up with patient in 4 weeks, or sooner if needed.       Medication Education Provided    Trueplus Pen needles NDC [address]-8    SAXENDA® (liraglutide)  Medication Expectations   Why am I taking this medication? You are taking Saxenda for weight loss because you have excess weight and also have weight-related medical problems or obesity. It should be used along with a reduced calorie diet and increased physical activity.    What should I expect while on this medication? You should expect to lose at least 4% of your body weight after 4 months of therapy. It can also help keep weight lost off.    How does the medication work? Saxenda is an injection that addresses one of your body's natural responses to weight loss. It works like a naturally produced hormone in the body called GLP-1 that regulates appetite which can lead to eating fewer calories and losing weight. This medication also slows down food from leaving your stomach, making you feel boggs for longer.   How long will I be on this medication for? The amount of time you will be on this medication will be determined by your doctor. Do not abruptly stop this medication without talking to your doctor first.    How do I take this medication? Take as directed on your prescription label. Saxenda is injected under the skin (subcutaneously) of your stomach, thigh, or upper arm. This medication should be taken once daily and can be given with or without food. Use a different injection site in the same body region each day.     For each new prefilled pen, prime the needle before the first injection by turning the dose  selector to the flow check symbol and injecting into the air (priming is not required for subsequent injections). Use a new needle for each injection. Once the needle is inserted, continue to press the button until the dial has returned to 0 and for an additional 6 seconds. Then remove the needle and discard.    What are some possible side effects? You may notice you don't feel as hungry, especially when you first start using Saxenda. Some common side effects include nausea, vomiting, diarrhea, vomiting, indigestion, constipation, tiredness, and headache. Redness, itching, and/or swelling can occur where the shot was given. You should also monitor for low blood sugar (hypoglycemia), especially if you are taking Saxenda with other medications that cause low blood sugar. Stop using Saxenda and call your doctor immediately if you have severe pain in your stomach area that will not go away as this could be a sign of pancreatitis (inflammation of your pancreas).     What happens if I miss a dose? If you miss a dose, take it as soon as you remember. If it is close to your next dose, skip it and go back to your regular dosing schedule. Never take 2 doses at once. If you miss your dose for 3 days or more, call your doctor to talk about how to restart Saxenda.      Medication Safety   What are things I should warn my doctor immediately about? Do not use Saxenda if you or a family member have ever had medullary thyroid cancer (MTC) or Multiple Endocrine Neoplasia syndrome type 2 (MEN 2). Tell your doctor if you get a lump or swelling in your neck, hoarseness, difficulty swallowing, or feel short of breath (these may be symptoms of thyroid cancer). Talk to your doctor if you have or have had problems with your pancreas, kidneys, or liver. Tell your doctor if you have problems with digesting food or slowed emptying of your stomach (gastroparesis). Talk to your doctor if you are pregnant, planning to become pregnant, or  breastfeeding. Also tell your doctor if you notice any signs/symptoms of an allergic reaction (rash, hives, difficulty breathing, etc.).   What are things that I should be cautious of? Be cautious of any side effects from this medication. Talk to your doctor if any new ones develop or aren't getting better.   What are some medications that can interact with this one? Taking Saxenda with other medications that lower your blood sugar such as insulins and glipizide/glimepiride/glyburide may increase the risk of low blood sugar. It should not be taken with other medicines called GLP-1 receptor agonists, as these work the same way as Saxenda. Because Saxenda slows stomach emptying, it can affect the way some medicines work. Always tell your doctor or pharmacist immediately if you start taking any new medications, including over-the-counter medications, vitamins, and herbal supplements.      Medication Storage/Handling   How should I handle this medication? Keep this medication out of reach of pets/children and keep the pen capped when not in use. Do not share your medicine pens with others.    How does this medication need to be stored? Store your new, unused pens in the original carton in the refrigerator. Protect it from light. Do not freeze. You may store your opened pen at room temperature or in the refrigerator for up to 30 days. Always remove the needle from the pen before storing.    How should I dispose of this medication? Used Saxenda pens should be thrown away after 30 days. Place your used pen and needle in an approved sharps container. If you do not have a sharps container, you may use a household container made of heavy-duty plastic with a tight-fitting lid that is leak resistant (e.g., heavy-duty plastic laundry detergent bottle). If your doctor decides to stop this medication, take to your local police station for proper disposal. Some pharmacies also have take-back bins for medication drop-off.        Resources/Support   How can I remind myself to take this medication? You can download reminder apps to help you manage your refills. You may also set an alarm on your phone to remind you.    Is financial support available?  [employer] can provide co-pay cards if you have commercial insurance.    Which vaccines are recommended for me? Talk to your doctor about these vaccines: Flu, Coronavirus (COVID-19), Pneumococcal (pneumonia), Tdap, Zoster (shingles)      Anahi Phillips, PharmD  10/10/2022  09:47 EDT

## 2022-10-10 NOTE — PROGRESS NOTES
[facility]  Weight Management Program        Kristy Iqbal is a 52 y.o. female referred to the [facility] by Ce Parra for clinical pharmacy and specialty pharmacy management of Mounjaro for weight management. Kristy Iqbal has previously tried gym, low carb diet, Noom, and Metabalife for weight loss. Patient is currently taking Mounjaro 2.5 mg doing anything for weight loss. The patient denies a personal history or family history of thyroid cancer, denies a personal history of pancreatitis, and denies a diagnosis of gastroparesis. Pt reports she has been tolerating Mounjaro well with minimal nausea, denies any missed doses or trouble injecting the medication.     Relevant Past Medical History and Co-morbidities  Past Medical History:   Diagnosis Date   • Heart murmur     questionable, hx scarlet fever as child   • Thyroid disease      Social History     Socioeconomic History   • Marital status:    Tobacco Use   • Smoking status: Never   • Smokeless tobacco: Never   Substance and Sexual Activity   • Alcohol use: No   • Drug use: No       Allergies  Ampicillin, Codeine, Penicillins, Sulfa antibiotics, Trimethoprim, and Cefdinir    Current Medication List    Current Outpatient Medications:   •  fexofenadine (ALLEGRA) 180 MG tablet, Take 180 mg by mouth Daily., Disp: , Rfl:   •  hepatitis A (HAVRIX) 1440 EL U/ML vaccine, Inject 1 mL into the appropriate muscle as directed by prescriber., Disp: 1 mL, Rfl: 1  •  Insulin Pen Needle (Pen Needles) 32G X 4 MM misc, Use to inject Saxenda daily as directed, Disp: 100 each, Rfl: 0  •  Liraglutide (Saxenda) 18 MG/3ML injection pen, Inject 0.6 mg under the skin into the appropriate area as directed Daily for 7 days, THEN 1.2 mg Daily for 7 days, THEN 1.8 mg Daily for 7 days, THEN 2.4 mg Daily for 7 days, THEN 3 mg Daily for 30 days., Disp: 15 mL, Rfl: 0  •  NON FORMULARY, Estradiol 250 mg, progesterone 8 g, testosterone 300 mg  "drops compounded at [facility] (6 drops at night), Disp: , Rfl:   •  spironolactone (ALDACTONE) 50 MG tablet, Take 1 tablet by mouth 2 (Two) Times a Day., Disp: 60 tablet, Rfl: 5  •  Thyroid (Clute Thyroid) 60 MG tablet, Take 1 tablet by mouth Daily., Disp: 30 tablet, Rfl: 6  •  Thyroid (Clute Thyroid) 60 MG tablet, Take 1 tablet by mouth Daily., Disp: 30 tablet, Rfl: 6    Drug Interactions  Saxenda + Estradiol + Progesterone: hyperglycemia-Associated Agents may diminish the therapeutic effect of Antidiabetic Agents  Saxenda + Testosterone: Androgens may enhance the hypoglycemic effect of Agents with Blood Glucose Lowering Effects    Relevant Laboratory Values  Lab Results   Component Value Date    CHOL 164 07/08/2021    CHLPL 216 (H) 02/01/2016    TRIG 83 07/08/2021    HDL 49 07/08/2021    LDL 99 07/08/2021     There is no height or weight on file to calculate BMI.    Vaccinations:   Patient recommended to keep up with routine vaccinations.     Goals of Therapy  • Clinical Goals or Therapeutic Targets: 10% weight loss goal      Date 9/12/22 10/10/22      Weight (lb) 184.4 179.6      BMI kg/ 32.66 31.81      Waist Circumference (in)  40.5 in 39.25\"          Medication Assessment & Plan    Patient's current BMI is 31.81, which is considered Class I Obesity. Pt has lost 4.8 lbs since last visit and 1.25\" off waist. Patient congratulated on success.    Due to changes in Mounjaro availability for patients that to do not have Type 2 Diabetes, Will order Saxenda 0.6 mg SC daily x 1 week, then increase to 1.2mg SC daily x 1 week, then increase to 1.8mg SC daily x 1 week, then increase to 2.4mg SC daily x 1 week, then increase to 3mg SC daily. If at anytime the patient cannot tolerate an increased dose during dose escalation, instructed Pt to delay dose escalation for 1 week and call clinic.     None of the patient's medications need adjusted based on the addition of Saxenda to medication list. Saxenda may " increase risk of hypoglycemia with the hormone drops, educated on monitoring for signs/symptoms of low blood sugar and how to correct. Patient education provided on Saxenda administration technique and patient seemed to have a good understanding of counseling. Pt denied any s/sx of hypoglycemia with previous administrations of Mounjaro.       Discussed lifestyle modifications, including diet and exercise. Patient education provided.     Worked with patient to create SMART Goal(s): drink more water every day (64 oz). Pt wishes to continue this goal.     Will follow-up with patient in 4 weeks, or sooner if needed.       Medication Education Provided    Trueplus Pen needles NDC [address]-4    SAXENDA® (liraglutide)  Medication Expectations   Why am I taking this medication? You are taking Saxenda for weight loss because you have excess weight and also have weight-related medical problems or obesity. It should be used along with a reduced calorie diet and increased physical activity.    What should I expect while on this medication? You should expect to lose at least 4% of your body weight after 4 months of therapy. It can also help keep weight lost off.    How does the medication work? Saxenda is an injection that addresses one of your body's natural responses to weight loss. It works like a naturally produced hormone in the body called GLP-1 that regulates appetite which can lead to eating fewer calories and losing weight. This medication also slows down food from leaving your stomach, making you feel boggs for longer.   How long will I be on this medication for? The amount of time you will be on this medication will be determined by your doctor. Do not abruptly stop this medication without talking to your doctor first.    How do I take this medication? Take as directed on your prescription label. Saxenda is injected under the skin (subcutaneously) of your stomach, thigh, or upper arm. This medication should be  taken once daily and can be given with or without food. Use a different injection site in the same body region each day.     For each new prefilled pen, prime the needle before the first injection by turning the dose selector to the flow check symbol and injecting into the air (priming is not required for subsequent injections). Use a new needle for each injection. Once the needle is inserted, continue to press the button until the dial has returned to 0 and for an additional 6 seconds. Then remove the needle and discard.    What are some possible side effects? You may notice you don't feel as hungry, especially when you first start using Saxenda. Some common side effects include nausea, vomiting, diarrhea, vomiting, indigestion, constipation, tiredness, and headache. Redness, itching, and/or swelling can occur where the shot was given. You should also monitor for low blood sugar (hypoglycemia), especially if you are taking Saxenda with other medications that cause low blood sugar. Stop using Saxenda and call your doctor immediately if you have severe pain in your stomach area that will not go away as this could be a sign of pancreatitis (inflammation of your pancreas).     What happens if I miss a dose? If you miss a dose, take it as soon as you remember. If it is close to your next dose, skip it and go back to your regular dosing schedule. Never take 2 doses at once. If you miss your dose for 3 days or more, call your doctor to talk about how to restart Saxenda.      Medication Safety   What are things I should warn my doctor immediately about? Do not use Saxenda if you or a family member have ever had medullary thyroid cancer (MTC) or Multiple Endocrine Neoplasia syndrome type 2 (MEN 2). Tell your doctor if you get a lump or swelling in your neck, hoarseness, difficulty swallowing, or feel short of breath (these may be symptoms of thyroid cancer). Talk to your doctor if you have or have had problems with your  pancreas, kidneys, or liver. Tell your doctor if you have problems with digesting food or slowed emptying of your stomach (gastroparesis). Talk to your doctor if you are pregnant, planning to become pregnant, or breastfeeding. Also tell your doctor if you notice any signs/symptoms of an allergic reaction (rash, hives, difficulty breathing, etc.).   What are things that I should be cautious of? Be cautious of any side effects from this medication. Talk to your doctor if any new ones develop or aren't getting better.   What are some medications that can interact with this one? Taking Saxenda with other medications that lower your blood sugar such as insulins and glipizide/glimepiride/glyburide may increase the risk of low blood sugar. It should not be taken with other medicines called GLP-1 receptor agonists, as these work the same way as Saxenda. Because Saxenda slows stomach emptying, it can affect the way some medicines work. Always tell your doctor or pharmacist immediately if you start taking any new medications, including over-the-counter medications, vitamins, and herbal supplements.      Medication Storage/Handling   How should I handle this medication? Keep this medication out of reach of pets/children and keep the pen capped when not in use. Do not share your medicine pens with others.    How does this medication need to be stored? Store your new, unused pens in the original carton in the refrigerator. Protect it from light. Do not freeze. You may store your opened pen at room temperature or in the refrigerator for up to 30 days. Always remove the needle from the pen before storing.    How should I dispose of this medication? Used Saxenda pens should be thrown away after 30 days. Place your used pen and needle in an approved sharps container. If you do not have a sharps container, you may use a household container made of heavy-duty plastic with a tight-fitting lid that is leak resistant (e.g., heavy-duty  plastic laundry detergent bottle). If your doctor decides to stop this medication, take to your local police station for proper disposal. Some pharmacies also have take-back bins for medication drop-off.       Resources/Support   How can I remind myself to take this medication? You can download reminder apps to help you manage your refills. You may also set an alarm on your phone to remind you.    Is financial support available?  [employer] can provide co-pay cards if you have commercial insurance.    Which vaccines are recommended for me? Talk to your doctor about these vaccines: Flu, Coronavirus (COVID-19), Pneumococcal (pneumonia), Tdap, Zoster (shingles)      Anahi Phillips, PharmD  10/10/2022  10:34 EDT

## 2022-11-07 ENCOUNTER — DISEASE STATE MANAGEMENT VISIT (OUTPATIENT)
Dept: PHARMACY | Facility: HOSPITAL | Age: 52
End: 2022-11-07

## 2022-11-07 VITALS
DIASTOLIC BLOOD PRESSURE: 77 MMHG | SYSTOLIC BLOOD PRESSURE: 110 MMHG | BODY MASS INDEX: 31.07 KG/M2 | WEIGHT: 175.4 LBS | HEART RATE: 80 BPM

## 2022-11-07 RX ORDER — LIRAGLUTIDE 6 MG/ML
3 INJECTION, SOLUTION SUBCUTANEOUS DAILY
Qty: 15 ML | Refills: 0 | Status: SHIPPED | OUTPATIENT
Start: 2022-11-07 | End: 2022-12-05 | Stop reason: SDUPTHER

## 2022-11-07 NOTE — PROGRESS NOTES
Medication Management Clinic  Weight Management Program        Kristy Iqbal is a 52 y.o. female referred to the [facility] by Ce Parra for clinical pharmacy and specialty pharmacy management of [facility] for weight management. Kristy Iqbal has previously tried gym, low carb diet, Noom, and Metabalife for weight loss. Patient is currently taking Saxenda for weight loss. Patient reports that she will be starting the 3 mg dose today. The patient reports tolerating the medication well, denies any missed doses and denies any issues providing self the injection. Patient denies any swelling/lump/hoarseness, etc in the throat.  The patient denies a personal history or family history of thyroid cancer, denies a personal history of pancreatitis, and denies a diagnosis of gastroparesis.    Relevant Past Medical History and Co-morbidities  Past Medical History:   Diagnosis Date   • Heart murmur     questionable, hx scarlet fever as child   • Thyroid disease      Social History     Socioeconomic History   • Marital status:    Tobacco Use   • Smoking status: Never   • Smokeless tobacco: Never   Substance and Sexual Activity   • Alcohol use: No   • Drug use: No       Allergies  Ampicillin, Codeine, Penicillins, Sulfa antibiotics, Trimethoprim, and Cefdinir    Current Medication List    Current Outpatient Medications:   •  fexofenadine (ALLEGRA) 180 MG tablet, Take 180 mg by mouth Daily., Disp: , Rfl:   •  hepatitis A (HAVRIX) 1440 EL U/ML vaccine, Inject 1 mL into the appropriate muscle as directed by prescriber., Disp: 1 mL, Rfl: 1  •  Insulin Pen Needle (Pen Needles) 32G X 4 MM misc, Use to inject Saxenda daily as directed, Disp: 100 each, Rfl: 0  •  Liraglutide (Saxenda) 18 MG/3ML injection pen, Inject 0.6 mg under the skin into the appropriate area as directed Daily for 7 days, THEN 1.2 mg Daily for 7 days, THEN 1.8 mg Daily for 7 days, THEN 2.4 mg Daily for 7 days, THEN 3 mg Daily for 30  "days., Disp: 15 mL, Rfl: 0  •  NON FORMULARY, Estradiol 250 mg, progesterone 8 g, testosterone 300 mg drops compounded at [facility] (6 drops at night), Disp: , Rfl:   •  spironolactone (ALDACTONE) 50 MG tablet, Take 1 tablet by mouth 2 (Two) Times a Day., Disp: 60 tablet, Rfl: 5  •  Thyroid ([place]ary Thyroid) 60 MG tablet, Take 1 tablet by mouth Daily., Disp: 30 tablet, Rfl: 6  •  Thyroid ([place]ary Thyroid) 60 MG tablet, Take 1 tablet by mouth Daily., Disp: 30 tablet, Rfl: 6    Drug Interactions  Saxenda + Estradiol + Progesterone: hyperglycemia-Associated Agents may diminish the therapeutic effect of Antidiabetic Agents  Saxenda + Testosterone: Androgens may enhance the hypoglycemic effect of Agents with Blood Glucose Lowering Effects    Relevant Laboratory Values  Lab Results   Component Value Date    CHOL 164 07/08/2021    CHLPL 216 (H) 02/01/2016    TRIG 83 07/08/2021    HDL 49 07/08/2021    LDL 99 07/08/2021     There is no height or weight on file to calculate BMI.    Vaccinations:   Patient recommended to keep up with routine vaccinations.     Goals of Therapy  • Clinical Goals or Therapeutic Targets: 10% weight loss goal      Date 9/12/22 10/10/22 11/7/22     Weight (lb) 184.4 179.6 175.4 lb     BMI kg/ 32.66 31.81 31.06     Waist Circumference (in)  40.5 in 39.25\" 38\"         Medication Assessment & Plan    Patient's current BMI is 31.06, which is considered Class I Obesity. Pt has lost 9 lbs since last visit and 2.5\" off waist. Patient congratulated on success thus far!    Will order Saxenda 3mg SC daily.     None of the patient's medications need adjusted based on the addition of Saxenda to medication list. Saxenda may increase risk of hypoglycemia with the hormone drops, educated on monitoring for signs/symptoms of low blood sugar and how to correct. Patient education provided on Saxenda administration technique and patient seemed to have a good understanding of counseling. Pt denied any s/sx of " hypoglycemia today.       Discussed lifestyle modifications, including diet and exercise. Patient education provided.     Worked with patient to create SMART Goal(s): drink more water every day (64 oz). Pt wishes to continue this goal.     Will follow-up with patient in 4 weeks, or sooner if needed.       Anahi Phillips, PharmD  11/7/2022  08:22 EST

## 2022-12-05 ENCOUNTER — LAB (OUTPATIENT)
Dept: LAB | Facility: HOSPITAL | Age: 52
End: 2022-12-05

## 2022-12-05 ENCOUNTER — DISEASE STATE MANAGEMENT VISIT (OUTPATIENT)
Dept: PHARMACY | Facility: HOSPITAL | Age: 52
End: 2022-12-05

## 2022-12-05 VITALS
SYSTOLIC BLOOD PRESSURE: 107 MMHG | WEIGHT: 173.2 LBS | HEART RATE: 82 BPM | BODY MASS INDEX: 30.68 KG/M2 | DIASTOLIC BLOOD PRESSURE: 73 MMHG

## 2022-12-05 DIAGNOSIS — E66.01 MORBID (SEVERE) OBESITY DUE TO EXCESS CALORIES: ICD-10-CM

## 2022-12-05 DIAGNOSIS — E66.01 MORBID (SEVERE) OBESITY DUE TO EXCESS CALORIES: Primary | ICD-10-CM

## 2022-12-05 LAB
ALBUMIN SERPL-MCNC: 4.2 G/DL (ref 3.5–5.2)
ALBUMIN/GLOB SERPL: 1.8 G/DL
ALP SERPL-CCNC: 49 U/L (ref 39–117)
ALT SERPL W P-5'-P-CCNC: 15 U/L (ref 1–33)
ANION GAP SERPL CALCULATED.3IONS-SCNC: 10.4 MMOL/L (ref 5–15)
AST SERPL-CCNC: 18 U/L (ref 1–32)
BILIRUB SERPL-MCNC: 0.5 MG/DL (ref 0–1.2)
BUN SERPL-MCNC: 11 MG/DL (ref 6–20)
BUN/CREAT SERPL: 12.6 (ref 7–25)
CALCIUM SPEC-SCNC: 9.5 MG/DL (ref 8.6–10.5)
CHLORIDE SERPL-SCNC: 105 MMOL/L (ref 98–107)
CHOLEST SERPL-MCNC: 188 MG/DL (ref 0–200)
CO2 SERPL-SCNC: 21.6 MMOL/L (ref 22–29)
CREAT SERPL-MCNC: 0.87 MG/DL (ref 0.57–1)
EGFRCR SERPLBLD CKD-EPI 2021: 79.3 ML/MIN/1.73
GLOBULIN UR ELPH-MCNC: 2.4 GM/DL
GLUCOSE SERPL-MCNC: 82 MG/DL (ref 65–99)
HDLC SERPL-MCNC: 43 MG/DL (ref 40–60)
LDLC SERPL CALC-MCNC: 121 MG/DL (ref 0–100)
LDLC/HDLC SERPL: 2.74 {RATIO}
POTASSIUM SERPL-SCNC: 4 MMOL/L (ref 3.5–5.2)
PROT SERPL-MCNC: 6.6 G/DL (ref 6–8.5)
SODIUM SERPL-SCNC: 137 MMOL/L (ref 136–145)
T-UPTAKE NFR SERPL: 1.23 TBI (ref 0.8–1.3)
T4 SERPL-MCNC: 5.19 MCG/DL (ref 4.5–11.7)
TRIGL SERPL-MCNC: 136 MG/DL (ref 0–150)
TSH SERPL DL<=0.05 MIU/L-ACNC: 1.24 UIU/ML (ref 0.27–4.2)
VLDLC SERPL-MCNC: 24 MG/DL (ref 5–40)

## 2022-12-05 PROCEDURE — 84479 ASSAY OF THYROID (T3 OR T4): CPT

## 2022-12-05 PROCEDURE — 36415 COLL VENOUS BLD VENIPUNCTURE: CPT

## 2022-12-05 PROCEDURE — 80053 COMPREHEN METABOLIC PANEL: CPT

## 2022-12-05 PROCEDURE — 84436 ASSAY OF TOTAL THYROXINE: CPT

## 2022-12-05 PROCEDURE — 84443 ASSAY THYROID STIM HORMONE: CPT

## 2022-12-05 PROCEDURE — 80061 LIPID PANEL: CPT

## 2022-12-05 PROCEDURE — 83036 HEMOGLOBIN GLYCOSYLATED A1C: CPT

## 2022-12-05 RX ORDER — LIRAGLUTIDE 6 MG/ML
3 INJECTION, SOLUTION SUBCUTANEOUS DAILY
Qty: 15 ML | Refills: 0 | Status: SHIPPED | OUTPATIENT
Start: 2022-12-05 | End: 2023-01-03

## 2022-12-05 NOTE — PROGRESS NOTES
[facility]ication Management Clinic  Weight Management Program        Kristy Iqbal is a 52 y.o. female referred to the [facility] by Ce Parra for clinical pharmacy and specialty pharmacy management of Saxenda for weight management. Kristy Iqbal has previously tried gym, low carb diet, Noom, and Metabalife for weight loss. Patient had also initially tried Mounjaro prior to regulation changes. Patient is currently taking Saxenda for weight loss. Patient reports that she is on the 3 mg dose. The patient reports tolerating the medication well, denies any missed doses and denies any issues providing self the injection. Patient reports she has changed injection to night time and it  Has eliminated most of any nausea she had previously experienced before.Patient denies any swelling/lump/hoarseness, etc in the throat.  The patient denies a personal history or family history of thyroid cancer, denies a personal history of pancreatitis, and denies a diagnosis of gastroparesis.    Relevant Past Medical History and Co-morbidities  Past Medical History:   Diagnosis Date   • Heart murmur     questionable, hx scarlet fever as child   • Thyroid disease      Social History     Socioeconomic History   • Marital status:    Tobacco Use   • Smoking status: Never   • Smokeless tobacco: Never   Substance and Sexual Activity   • Alcohol use: No   • Drug use: No       Allergies  Ampicillin, Codeine, Penicillins, Sulfa antibiotics, Trimethoprim, and Cefdinir    Current Medication List    Current Outpatient Medications:   •  fexofenadine (ALLEGRA) 180 MG tablet, Take 180 mg by mouth Daily., Disp: , Rfl:   •  hepatitis A (HAVRIX) 1440 EL U/ML vaccine, Inject 1 mL into the appropriate muscle as directed by prescriber., Disp: 1 mL, Rfl: 1  •  Insulin Pen Needle (Pen Needles) 32G X 4 MM misc, Use to inject Saxenda daily as directed, Disp: 100 each, Rfl: 0  •  Liraglutide (Saxenda) 18 MG/3ML injection pen, Inject 3 mg  "under the skin into the appropriate area as directed Daily., Disp: 15 mL, Rfl: 0  •  NON FORMULARY, Estradiol 250 mg, progesterone 8 g, testosterone 300 mg drops compounded at [facility] (6 drops at night), Disp: , Rfl:   •  spironolactone (ALDACTONE) 50 MG tablet, Take 1 tablet by mouth 2 (Two) Times a Day., Disp: 60 tablet, Rfl: 5  •  Thyroid ([place] Thyroid) 60 MG tablet, Take 1 tablet by mouth Daily., Disp: 30 tablet, Rfl: 6  •  Thyroid ([place]fish Thyroid) 60 MG tablet, Take 1 tablet by mouth Daily., Disp: 30 tablet, Rfl: 6    Drug Interactions  Saxenda + Estradiol + Progesterone: hyperglycemia-Associated Agents may diminish the therapeutic effect of Antidiabetic Agents  Saxenda + Testosterone: Androgens may enhance the hypoglycemic effect of Agents with Blood Glucose Lowering Effects    Relevant Laboratory Values  Lab Results   Component Value Date    CHOL 164 07/08/2021    CHLPL 216 (H) 02/01/2016    TRIG 83 07/08/2021    HDL 49 07/08/2021    LDL 99 07/08/2021     There is no height or weight on file to calculate BMI.    Vaccinations:   Patient recommended to keep up with routine vaccinations.     Goals of Therapy  • Clinical Goals or Therapeutic Targets: 10% weight loss goal      Date 9/12/22 10/10/22 11/7/22 12/5/22    Weight (lb) 184.4 179.6 175.4 lb 173.2    BMI kg/ 32.66 31.81 31.06 30.68    Waist Circumference (in)  40.5 in 39.25\" 38\" 39\"        Medication Assessment & Plan    Patient's current BMI is 30.68, which is considered Class I Obesity. Pt has lost 11.2 lbs since beginning. Patient congratulated on success thus far!    Will order Saxenda 3mg SC daily.     Have encouraged patient to keep routine follow-up with referring provider, Ce Parra.    Patient to get labs today: Orders placed (orginally ordered as [facility] in error however asked Dario in lab to discontinue and re-sent orders under Ce Parra): Lipid Panel, Thyroid Panel, CMP, Hgb A1C    None of the patient's medications need " adjusted based on the addition of Saxenda to medication list. Saxenda may increase risk of hypoglycemia with the hormone drops, educated on monitoring for signs/symptoms of low blood sugar and how to correct. Patient education provided on Saxenda administration technique and patient seemed to have a good understanding of counseling. Pt denied any s/sx of hypoglycemia today.       Discussed lifestyle modifications, including diet and exercise. Patient education provided.     Worked with patient to create SMART Goal(s): drink more water every day (64 oz). Pt wishes to continue this goal.     Will follow-up with patient in 4 weeks, or sooner if needed.       Anahi Phillips, PharmD  12/5/2022  08:55 EST

## 2022-12-06 LAB — HBA1C MFR BLD: 5.2 % (ref 4.8–5.6)

## 2023-01-03 ENCOUNTER — DISEASE STATE MANAGEMENT VISIT (OUTPATIENT)
Dept: PHARMACY | Facility: HOSPITAL | Age: 53
End: 2023-01-03
Payer: COMMERCIAL

## 2023-01-03 ENCOUNTER — SPECIALTY PHARMACY (OUTPATIENT)
Dept: PHARMACY | Facility: HOSPITAL | Age: 53
End: 2023-01-03
Payer: COMMERCIAL

## 2023-01-03 VITALS
DIASTOLIC BLOOD PRESSURE: 75 MMHG | HEART RATE: 73 BPM | BODY MASS INDEX: 30.08 KG/M2 | SYSTOLIC BLOOD PRESSURE: 113 MMHG | WEIGHT: 169.8 LBS

## 2023-01-03 PROBLEM — E66.9 OBESITY, CLASS I, BMI 30-34.9: Status: ACTIVE | Noted: 2023-01-03

## 2023-01-03 PROBLEM — E66.811 OBESITY, CLASS I, BMI 30-34.9: Status: ACTIVE | Noted: 2023-01-03

## 2023-01-03 RX ORDER — SEMAGLUTIDE 1 MG/.5ML
1 INJECTION, SOLUTION SUBCUTANEOUS WEEKLY
Qty: 2 ML | Refills: 0 | Status: SHIPPED | OUTPATIENT
Start: 2023-01-03 | End: 2023-01-31

## 2023-01-03 NOTE — PROGRESS NOTES
[facility]  Weight Management Program        Kristy Iqbal is a 52 y.o. female referred to the [facility] by Ce Parra for clinical pharmacy and specialty pharmacy management of Saxenda for weight management. Kristy Iqbal has previously tried gym, low carb diet, Noom, and Metabalife for weight loss. Patient had also initially tried Mounjaro prior to regulation changes. Patient is currently taking Saxenda 3mg for weight loss. The patient reports tolerating the medication well, denies any missed doses and denies any issues providing self the injection. Patient reports she did change her injection time to night time but reported that it was not tolerated. Since then, she has resumed taking it after breakfast and reports only minimal to no nausea. Patient denies any swelling/lump/hoarseness, etc in the throat.  The patient denies a personal history or family history of thyroid cancer, denies a personal history of pancreatitis, and denies a diagnosis of gastroparesis. Pt reports that she is close to meeting her water intake goal each day, but does not quite make it. She will continue to strive towards this goal. She is interested in switching to a once weekly injection.       Relevant Past Medical History and Co-morbidities  Past Medical History:   Diagnosis Date   • Heart murmur     questionable, hx scarlet fever as child   • Thyroid disease      Social History     Socioeconomic History   • Marital status:    Tobacco Use   • Smoking status: Never   • Smokeless tobacco: Never   Substance and Sexual Activity   • Alcohol use: No   • Drug use: No       Allergies  Ampicillin, Codeine, Penicillins, Sulfa antibiotics, Trimethoprim, and Cefdinir    Current Medication List    Current Outpatient Medications:   •  fexofenadine (ALLEGRA) 180 MG tablet, Take 180 mg by mouth Daily., Disp: , Rfl:   •  Insulin Pen Needle (Pen Needles) 32G X 4 MM misc, Use to inject Saxenda daily as  directed, Disp: 100 each, Rfl: 0  •  Liraglutide (Saxenda) 18 MG/3ML injection pen, Inject 3 mg under the skin into the appropriate area as directed Daily., Disp: 15 mL, Rfl: 0  •  NON FORMULARY, Estradiol 250 mg, progesterone 8 g, testosterone 300 mg drops compounded at [facility] (6 drops at night), Disp: , Rfl:   •  spironolactone (ALDACTONE) 50 MG tablet, Take 1 tablet by mouth 2 times every day, Disp: 60 tablet, Rfl: 6  •  Thyroid ([place] Thyroid) 60 MG tablet, Take 1 tablet by mouth Daily., Disp: 30 tablet, Rfl: 6  •  hepatitis A (HAVRIX) 1440 EL U/ML vaccine, Inject 1 mL into the appropriate muscle as directed by prescriber., Disp: 1 mL, Rfl: 1  •  Thyroid ([place] Thyroid) 60 MG tablet, Take 1 tablet by mouth Daily., Disp: 30 tablet, Rfl: 6  •  Thyroid ([place] Thyroid) 60 MG tablet, Take 1 tablet by mouth every day, Disp: 30 tablet, Rfl: 6    Drug Interactions  Saxenda + Estradiol + Progesterone: hyperglycemia-Associated Agents may diminish the therapeutic effect of Antidiabetic Agents  Saxenda + Testosterone: Androgens may enhance the hypoglycemic effect of Agents with Blood Glucose Lowering Effects  No interreactions with Wegovy    Relevant Laboratory Values  Lab Results   Component Value Date    CHOL 188 12/05/2022    CHLPL 216 (H) 02/01/2016    TRIG 136 12/05/2022    HDL 43 12/05/2022     (H) 12/05/2022     Body mass index is 30.08 kg/m².    Vaccinations:   Patient recommended to keep up with routine vaccinations.     Goals of Therapy  • Clinical Goals or Therapeutic Targets: 10% weight loss goal      Date 9/12/22 10/10/22 11/7/22 12/5/22 1/3/23   Weight (lb) 184.4 179.6 175.4 lb 173.2 169.8   BMI kg/ 32.66 31.81 31.06 30.68 30.07   Waist Circumference (in)  40.5 in 39.25\" 38\" 39\" 36\"  * will start Wegovy 1mg       Medication Assessment & Plan    Patient's current BMI is 30.07, which is considered Class I Obesity. Pt has lost 14.6 lbs since beginning. Patient congratulated on success thus  far!    Patient will continue Saxenda at this time (pt reports still having 2 pens at home), until Wegovy PA is approved. Will order Wegovy 1mg SC weekly.     Patient did have labs drawn and followed up with Ce Parra who is ok with the patient proceeding with Saxenda at this time per patient. Only her LDL was high.     None of the patient's medications need adjusted based on the addition of Wegovy to medication list.     Discussed lifestyle modifications, including diet and exercise. Patient education provided.     WEGOVY™ (semaglutide)  Medication Expectations   Why am I taking this medication? You are taking Wegovy to help you lose weight and to help with weight-related medical problems.  Wegovy will also help you keep your weight controlled.  It should be used along with a reduced calorie diet and increased physical activity.   What should I expect while on this medication? You should lose some weight; however, people respond to medications differently.  Talk with your healthcare provider about realistic expectations for your weight loss goals.  In clinical trials, patients lost an average of ~35 lbs. over a ~15-month period.   How does the medication work? Wegovy is an injection that works with one of your body's natural responses to weight loss.  It works like a hormone, called GLP1, that helps regulate your appetite; this helps you eat less and can lead to weight loss.  This medication also slows down food from leaving your stomach, making you feel boggs for longer.   How long will I be on this medication for? The amount of time you will be on this medication will be determined by your doctor based on your weight loss and how well you tolerate the medication. Do not abruptly stop this medication without talking to your doctor first.    How do I take this medication? Take as directed on your prescription label. Wegovy is supplied in a single-use pen for each dose and you will use a new pre-filled pen each  week.  It is injected under the skin (subcutaneously) of your stomach, thigh or upper arm.  You may inject in the same body area each week, but make sure to use a different spot each time.  Use this medication once weekly, on the same day each week, with or without food.      First, choose your injection site and clean the area, allowing it to dry completely.  Remove the pen cap by pulling it straight off.    • Push the pen firmly against your skin at your injection site.    • You will hear a click once the injection starts and a second click indicating the injection is in process.  The injection will take about 5-10 seconds.    • Continue pressing against your skin until the yellow bar stops moving, and then you will slowly lift the pen from your skin and dispose of the pen (detailed below in “Medication Storage / Handling”).   What are some possible side effects? You may notice you don't feel as hungry, especially when you first start using Wegovy.  Some common side effects are nausea, diarrhea, vomiting, stomach pain, gas, bloating, heartburn and constipation.  Additionally, headache, tiredness, and dizziness may occur.  Redness, itching, and/or swelling can occur where the shot was given.  You should also monitor for low blood sugar (hypoglycemia), especially if you are taking Wegovy with other medications that cause low blood sugar.      Stop using Wegovy and call your doctor immediately if you have severe pain in your stomach area that will not go away as this could be a sign of pancreatitis (inflammation of your pancreas).  Talk with your doctor if you have changes in vision while taking Wegovy.   What happens if I miss a dose? If you miss a dose, take it as soon as you remember as long as your next scheduled dose is more than 2 days away.  If your next scheduled dose is within 2 days, take that regularly scheduled dose and skip your missed dose.      If you miss more than 2 weeks of doses, take the next dose  on the regularly scheduled day or call your healthcare provider to talk about how to restart your Wegovy.     Medication Safety   What are things I should warn my doctor immediately about? Do not use Wegovy if you or a family member have ever had medullary thyroid cancer (MTC) or Multiple Endocrine Neoplasia syndrome type 2 (MEN 2).  Tell your doctor if you get a lump or swelling in your neck, hoarseness, difficulty swallowing, or feel short of breath (these may be symptoms of thyroid cancer).    Tell your doctor if you have or have had problems with your kidneys, pancreas, or liver, or if you have a history of diabetic retinopathy.  Tell your doctor if you have problem digesting food or slowed emptying of your stomach (gastroparesis).  Talk to your doctor if you are pregnant, planning to become pregnant, or breastfeeding. Also tell your doctor if you notice any signs/symptoms of an allergic reaction (rash, hives, difficulty breathing, etc.).   What are things that I should be cautious of? Be cautious of any side effect from this medication. Talk to your doctor if any new ones develop or aren't getting better.   What are some medications that can interact with this one? Taking Wegovy with other medications that may also lower your blood sugar such as insulin and glipizide/glimepiride/glyburide may increase the risk of low blood sugar (hypoglycemia). Your doctor may reduce the dose of these medications when you start Wegovy to minimize low blood sugars.  It should not be taken with other medicines called GLP-1 receptor agonists, because these work the same way as Wegovy.  Because Wegovy slows stomach emptying, it can affect the way some medicines work. Always tell your doctor or pharmacist immediately if you start taking any new medications, including over-the-counter medications, vitamins, and herbal supplements.      Medication Storage/Handling   How should I handle this medication? Keep this medication out of  reach of pets/children and keep the pen capped when not in use.  Do not share your pens with others.   How does this medication need to be stored? Store in the refrigerator [2°C to 8°C (36°F to 46°F)]. Prior to cap removal, the pen can be kept at room temperature [8°C to 30°C (46°F to 86°F)] for up to 28 days. Do not freeze; protect from light. Keep in original carton until time of administration.   How should I dispose of this medication? Used Wegovy pens should discarded after each use (for single use only). Place your used Wegovy pen and needle in an approved sharps container after use.  If you do not have a sharps container, you may use a household container made of heavy-duty plastic with a tight-fitting lid that is leak resistant (e.g., heavy-duty plastic laundry detergent bottle).    If your doctor decides to stop this medication, take to your local police station for proper disposal. Some pharmacies also have take-back bins for medication drop-off.      Resources/Support   How can I remind myself to take this medication? You can download reminder apps to help you manage your refills. You may also set an alarm on your phone to remind you.    Is financial support available?  [employer] can provide co-pay cards if you have commercial insurance or patient assistance if you have Medicare or no insurance.    Which vaccines are recommended for me? Talk to your doctor about these vaccines:   • Influenza (flu)  • Coronavirus (COVID-19)  • Pneumococcal (pneumonia)  • Tdap (tetanus, diphtheria, pertussis)  • Hepatitis B  • Zoster (shingles)        Worked with patient to create SMART Goal(s): drink more water every day (64 oz). Pt wishes to continue this goal.     Will follow-up with patient in 4 weeks, or sooner if needed.       Sanjana Douglass RPH  1/3/2023  09:13 EST

## 2023-01-03 NOTE — PROGRESS NOTES
[facility]ication Management Clinic  Weight Management Program        Kristy Iqbal is a 52 y.o. female referred to the [facility] by Ce Parra for clinical pharmacy and specialty pharmacy management of Saxenda for weight management. Kristy Iqbal has previously tried gym, low carb diet, Noom, and Metabalife for weight loss. Patient had also initially tried Mounjaro prior to regulation changes. Patient is currently taking Saxenda 3mg for weight loss. The patient reports tolerating the medication well, denies any missed doses and denies any issues providing self the injection. Patient reports she did change her injection time to night time but reported that it was not tolerated. Since then, she has resumed taking it after breakfast and reports only minimal to no nausea. Patient denies any swelling/lump/hoarseness, etc in the throat.  The patient denies a personal history or family history of thyroid cancer, denies a personal history of pancreatitis, and denies a diagnosis of gastroparesis. Pt reports that she is close to meeting her water intake goal each day, but does not quite make it. She will continue to strive towards this goal. She is interested in switching to a once weekly injection.       Relevant Past Medical History and Co-morbidities  Past Medical History:   Diagnosis Date   • Heart murmur     questionable, hx scarlet fever as child   • Thyroid disease      Social History     Socioeconomic History   • Marital status:    Tobacco Use   • Smoking status: Never   • Smokeless tobacco: Never   Substance and Sexual Activity   • Alcohol use: No   • Drug use: No       Allergies  Ampicillin, Codeine, Penicillins, Sulfa antibiotics, Trimethoprim, and Cefdinir    Current Medication List    Current Outpatient Medications:   •  fexofenadine (ALLEGRA) 180 MG tablet, Take 180 mg by mouth Daily., Disp: , Rfl:   •  hepatitis A (HAVRIX) 1440 EL U/ML vaccine, Inject 1 mL into the appropriate muscle  as directed by prescriber., Disp: 1 mL, Rfl: 1  •  Insulin Pen Needle (Pen Needles) 32G X 4 MM misc, Use to inject Saxenda daily as directed, Disp: 100 each, Rfl: 0  •  Liraglutide (Saxenda) 18 MG/3ML injection pen, Inject 3 mg under the skin into the appropriate area as directed Daily., Disp: 15 mL, Rfl: 0  •  NON FORMULARY, Estradiol 250 mg, progesterone 8 g, testosterone 300 mg drops compounded at [facility] (6 drops at night), Disp: , Rfl:   •  spironolactone (ALDACTONE) 50 MG tablet, Take 1 tablet by mouth 2 times every day, Disp: 60 tablet, Rfl: 6  •  Thyroid ([employer] Thyroid) 60 MG tablet, Take 1 tablet by mouth Daily., Disp: 30 tablet, Rfl: 6  •  Thyroid ([employer] Thyroid) 60 MG tablet, Take 1 tablet by mouth Daily., Disp: 30 tablet, Rfl: 6  •  Thyroid ([employer] Thyroid) 60 MG tablet, Take 1 tablet by mouth every day, Disp: 30 tablet, Rfl: 6    Drug Interactions  Saxenda + Estradiol + Progesterone: hyperglycemia-Associated Agents may diminish the therapeutic effect of Antidiabetic Agents  Saxenda + Testosterone: Androgens may enhance the hypoglycemic effect of Agents with Blood Glucose Lowering Effects  No interreactions with Wegovy    Relevant Laboratory Values  Lab Results   Component Value Date    CHOL 188 12/05/2022    CHLPL 216 (H) 02/01/2016    TRIG 136 12/05/2022    HDL 43 12/05/2022     (H) 12/05/2022     There is no height or weight on file to calculate BMI.    Vaccinations:   Patient recommended to keep up with routine vaccinations.     Goals of Therapy  • Clinical Goals or Therapeutic Targets: 10% weight loss goal      Date 9/12/22 10/10/22 11/7/22 12/5/22 1/3/23   Weight (lb) 184.4 179.6 175.4 lb 173.2 169.8   BMI kg/ 32.66 31.81 31.06 30.68 30.07   Waist Circumference (in)  40.5 in 39.25\" 38\" 39\" 36\"  * will start Wegovy 1mg       Medication Assessment & Plan    Patient's current BMI is 30.07, which is considered Class I Obesity. Pt has lost 14.6 lbs since beginning. Patient  congratulated on success thus far!    Patient will continue Saxenda at this time (pt reports still having 2 pens at home), until Wegovy PA is approved. Will order Wegovy 1mg SC weekly.     Patient did have labs drawn and followed up with Ce Parra who is ok with the patient proceeding with Saxenda at this time per patient. Only her LDL was high.     None of the patient's medications need adjusted based on the addition of Wegovy to medication list.     Discussed lifestyle modifications, including diet and exercise. Patient education provided.     WEGOVY™ (semaglutide)  Medication Expectations   Why am I taking this medication? You are taking Wegovy to help you lose weight and to help with weight-related medical problems.  Wegovy will also help you keep your weight controlled.  It should be used along with a reduced calorie diet and increased physical activity.   What should I expect while on this medication? You should lose some weight; however, people respond to medications differently.  Talk with your healthcare provider about realistic expectations for your weight loss goals.  In clinical trials, patients lost an average of ~35 lbs. over a ~15-month period.   How does the medication work? Wegovy is an injection that works with one of your body's natural responses to weight loss.  It works like a hormone, called GLP1, that helps regulate your appetite; this helps you eat less and can lead to weight loss.  This medication also slows down food from leaving your stomach, making you feel boggs for longer.   How long will I be on this medication for? The amount of time you will be on this medication will be determined by your doctor based on your weight loss and how well you tolerate the medication. Do not abruptly stop this medication without talking to your doctor first.    How do I take this medication? Take as directed on your prescription label. Wegovy is supplied in a single-use pen for each dose and you will  use a new pre-filled pen each week.  It is injected under the skin (subcutaneously) of your stomach, thigh or upper arm.  You may inject in the same body area each week, but make sure to use a different spot each time.  Use this medication once weekly, on the same day each week, with or without food.      First, choose your injection site and clean the area, allowing it to dry completely.  Remove the pen cap by pulling it straight off.    • Push the pen firmly against your skin at your injection site.    • You will hear a click once the injection starts and a second click indicating the injection is in process.  The injection will take about 5-10 seconds.    • Continue pressing against your skin until the yellow bar stops moving, and then you will slowly lift the pen from your skin and dispose of the pen (detailed below in “Medication Storage / Handling”).   What are some possible side effects? You may notice you don't feel as hungry, especially when you first start using Wegovy.  Some common side effects are nausea, diarrhea, vomiting, stomach pain, gas, bloating, heartburn and constipation.  Additionally, headache, tiredness, and dizziness may occur.  Redness, itching, and/or swelling can occur where the shot was given.  You should also monitor for low blood sugar (hypoglycemia), especially if you are taking Wegovy with other medications that cause low blood sugar.      Stop using Wegovy and call your doctor immediately if you have severe pain in your stomach area that will not go away as this could be a sign of pancreatitis (inflammation of your pancreas).  Talk with your doctor if you have changes in vision while taking Wegovy.   What happens if I miss a dose? If you miss a dose, take it as soon as you remember as long as your next scheduled dose is more than 2 days away.  If your next scheduled dose is within 2 days, take that regularly scheduled dose and skip your missed dose.      If you miss more than 2  weeks of doses, take the next dose on the regularly scheduled day or call your healthcare provider to talk about how to restart your Wegovy.     Medication Safety   What are things I should warn my doctor immediately about? Do not use Wegovy if you or a family member have ever had medullary thyroid cancer (MTC) or Multiple Endocrine Neoplasia syndrome type 2 (MEN 2).  Tell your doctor if you get a lump or swelling in your neck, hoarseness, difficulty swallowing, or feel short of breath (these may be symptoms of thyroid cancer).    Tell your doctor if you have or have had problems with your kidneys, pancreas, or liver, or if you have a history of diabetic retinopathy.  Tell your doctor if you have problem digesting food or slowed emptying of your stomach (gastroparesis).  Talk to your doctor if you are pregnant, planning to become pregnant, or breastfeeding. Also tell your doctor if you notice any signs/symptoms of an allergic reaction (rash, hives, difficulty breathing, etc.).   What are things that I should be cautious of? Be cautious of any side effect from this medication. Talk to your doctor if any new ones develop or aren't getting better.   What are some medications that can interact with this one? Taking Wegovy with other medications that may also lower your blood sugar such as insulin and glipizide/glimepiride/glyburide may increase the risk of low blood sugar (hypoglycemia). Your doctor may reduce the dose of these medications when you start Wegovy to minimize low blood sugars.  It should not be taken with other medicines called GLP-1 receptor agonists, because these work the same way as Wegovy.  Because Wegovy slows stomach emptying, it can affect the way some medicines work. Always tell your doctor or pharmacist immediately if you start taking any new medications, including over-the-counter medications, vitamins, and herbal supplements.      Medication Storage/Handling   How should I handle this  medication? Keep this medication out of reach of pets/children and keep the pen capped when not in use.  Do not share your pens with others.   How does this medication need to be stored? Store in the refrigerator [2°C to 8°C (36°F to 46°F)]. Prior to cap removal, the pen can be kept at room temperature [8°C to 30°C (46°F to 86°F)] for up to 28 days. Do not freeze; protect from light. Keep in original carton until time of administration.   How should I dispose of this medication? Used Wegovy pens should discarded after each use (for single use only). Place your used Wegovy pen and needle in an approved sharps container after use.  If you do not have a sharps container, you may use a household container made of heavy-duty plastic with a tight-fitting lid that is leak resistant (e.g., heavy-duty plastic laundry detergent bottle).    If your doctor decides to stop this medication, take to your local police station for proper disposal. Some pharmacies also have take-back bins for medication drop-off.      Resources/Support   How can I remind myself to take this medication? You can download reminder apps to help you manage your refills. You may also set an alarm on your phone to remind you.    Is financial support available?  [employer] can provide co-pay cards if you have commercial insurance or patient assistance if you have Medicare or no insurance.    Which vaccines are recommended for me? Talk to your doctor about these vaccines:   • Influenza (flu)  • Coronavirus (COVID-19)  • Pneumococcal (pneumonia)  • Tdap (tetanus, diphtheria, pertussis)  • Hepatitis B  • Zoster (shingles)        Worked with patient to create SMART Goal(s): drink more water every day (64 oz). Pt wishes to continue this goal.     Will follow-up with patient in 4 weeks, or sooner if needed.       Sanjana Douglass RPH  1/3/2023  09:24 EST

## 2023-01-30 ENCOUNTER — SPECIALTY PHARMACY (OUTPATIENT)
Dept: PHARMACY | Facility: HOSPITAL | Age: 53
End: 2023-01-30
Payer: COMMERCIAL

## 2023-01-31 ENCOUNTER — DISEASE STATE MANAGEMENT VISIT (OUTPATIENT)
Dept: PHARMACY | Facility: HOSPITAL | Age: 53
End: 2023-01-31
Payer: COMMERCIAL

## 2023-01-31 VITALS
HEIGHT: 63 IN | BODY MASS INDEX: 29.38 KG/M2 | DIASTOLIC BLOOD PRESSURE: 74 MMHG | SYSTOLIC BLOOD PRESSURE: 110 MMHG | WEIGHT: 165.8 LBS | HEART RATE: 87 BPM

## 2023-01-31 RX ORDER — SEMAGLUTIDE 1.7 MG/.75ML
1.7 INJECTION, SOLUTION SUBCUTANEOUS WEEKLY
Qty: 3 ML | Refills: 0 | Status: SHIPPED | OUTPATIENT
Start: 2023-01-31 | End: 2023-02-28

## 2023-01-31 NOTE — PROGRESS NOTES
[facility]  Weight Management Program        Kristy Iqbal is a 52 y.o. female referred to the [facility] by Ce Parra for clinical pharmacy and specialty pharmacy management of [facility] for weight management. Kristy Iqbal has previously tried gym, low carb diet, Noom, Saxenda, and Metabalife for weight loss. Patient had also initially tried Mounjaro prior to regulation changes.  The patient denies a personal history or family history of thyroid cancer, denies a personal history of pancreatitis, and denies a diagnosis of gastroparesis.Patient denies any swelling/lump/hoarseness, etc in the throat.         Patient switched to Wegovy 1 mg on last visit. The patient reports tolerating the medication well, denies any missed doses and denies any issues providing self the injection. Patient is very happy with the change to Wegovy and feels it is controlling appetite better than what Saxenda was.     Relevant Past Medical History and Co-morbidities  Past Medical History:   Diagnosis Date   • Heart murmur     questionable, hx scarlet fever as child   • Thyroid disease      Social History     Socioeconomic History   • Marital status:    Tobacco Use   • Smoking status: Never   • Smokeless tobacco: Never   Substance and Sexual Activity   • Alcohol use: No   • Drug use: No       Allergies  Ampicillin, Codeine, Penicillins, Sulfa antibiotics, Trimethoprim, and Cefdinir    Current Medication List    Current Outpatient Medications:   •  fexofenadine (ALLEGRA) 180 MG tablet, Take 180 mg by mouth Daily., Disp: , Rfl:   •  NON FORMULARY, Estradiol 250 mg, progesterone 8 g, testosterone 300 mg drops compounded at [facility] (6 drops at night), Disp: , Rfl:   •  Semaglutide-Weight Management (Wegovy) 1 MG/0.5ML solution auto-injector, Inject 1 mg under the skin into the appropriate area as directed 1 (One) Time Per Week., Disp: 2 mL, Rfl: 0  •  spironolactone (ALDACTONE) 50 MG  "tablet, Take 1 tablet by mouth 2 times every day, Disp: 60 tablet, Rfl: 6  •  Thyroid ([place] Thyroid) 60 MG tablet, Take 1 tablet by mouth Daily., Disp: 30 tablet, Rfl: 6  •  Thyroid ([place] Thyroid) 60 MG tablet, Take 1 tablet by mouth every day, Disp: 30 tablet, Rfl: 6    Drug Interactions  Saxenda + Estradiol + Progesterone: hyperglycemia-Associated Agents may diminish the therapeutic effect of Antidiabetic Agents  Saxenda + Testosterone: Androgens may enhance the hypoglycemic effect of Agents with Blood Glucose Lowering Effects  No interreactions with Wegovy    Relevant Laboratory Values  Lab Results   Component Value Date    CHOL 188 12/05/2022    CHLPL 216 (H) 02/01/2016    TRIG 136 12/05/2022    HDL 43 12/05/2022     (H) 12/05/2022     There is no height or weight on file to calculate BMI.    Vaccinations:   Patient recommended to keep up with routine vaccinations.     Goals of Therapy  • Clinical Goals or Therapeutic Targets: 10% weight loss goal      Date 9/12/22 10/10/22 11/7/22 12/5/22 1/3/23 1/30/23   Weight (lb) 184.4 179.6 175.4 lb 173.2 169.8 165.8 lbs   BMI kg/ 32.66 31.81 31.06 30.68 30.07 29.37   Waist Circumference (in)  40.5 in 39.25\" 38\" 39\" 36\"  * will start Wegovy 1mg 36.25\"       Medication Assessment & Plan    Patient's current BMI is 29.37, which is considered overweight. Pt has lost 18.6 lbs since beginning. Patient congratulated on success thus far!     Will order Wegovy 1.7 mg SC weekly.       None of the patient's medications need adjusted based on the addition of Wegovy to medication list.     Discussed lifestyle modifications, including diet and exercise. Patient education provided.       Worked with patient to create SMART Goal(s): drink more water every day (64 oz). Pt wishes to continue this goal.     Will follow-up with patient in 4 weeks, or sooner if needed.       Anahi Larkin. Alan, PharmD  1/31/2023  09:16 EST  "

## 2023-02-28 ENCOUNTER — DISEASE STATE MANAGEMENT VISIT (OUTPATIENT)
Dept: PHARMACY | Facility: HOSPITAL | Age: 53
End: 2023-02-28
Payer: COMMERCIAL

## 2023-02-28 VITALS
HEART RATE: 83 BPM | SYSTOLIC BLOOD PRESSURE: 111 MMHG | HEIGHT: 63 IN | BODY MASS INDEX: 28.67 KG/M2 | WEIGHT: 161.8 LBS | DIASTOLIC BLOOD PRESSURE: 76 MMHG

## 2023-02-28 RX ORDER — SEMAGLUTIDE 2.4 MG/.75ML
2.4 INJECTION, SOLUTION SUBCUTANEOUS WEEKLY
Qty: 3 ML | Refills: 0 | Status: SHIPPED | OUTPATIENT
Start: 2023-02-28 | End: 2023-03-28 | Stop reason: SDUPTHER

## 2023-02-28 NOTE — PROGRESS NOTES
Medication Management Clinic  Weight Management Program        Kristy Iqbal is a 53 y.o. female referred to the [facility] Management Clinic by Ce Parra for clinical pharmacy and specialty pharmacy management of [facility] for weight management. Kristy Iqbal has previously tried gym, low carb diet, Noom, Saxenda, and Metabalife for weight loss. Patient had also initially tried Mounjaro prior to regulation changes.  The patient denies a personal history or family history of thyroid cancer, denies a personal history of pancreatitis, and denies a diagnosis of gastroparesis.Patient denies any swelling/lump/hoarseness, etc in the throat.         Patient is currently on Wegovy 1.7 mg. The patient reports tolerating the medication well, denies any missed doses and denies any issues providing self the injection. Patient reports 1 day of a few hours of nausea but she had also been sick with drainage this day. She denies any reoccurrences.  Patient is very happy with how Wegovy is working and controlling appetite.       Relevant Past Medical History and Co-morbidities  Past Medical History:   Diagnosis Date   • Heart murmur     questionable, hx scarlet fever as child   • Thyroid disease      Social History     Socioeconomic History   • Marital status:    Tobacco Use   • Smoking status: Never   • Smokeless tobacco: Never   Substance and Sexual Activity   • Alcohol use: No   • Drug use: No       Allergies  Ampicillin, Codeine, Penicillins, Sulfa antibiotics, Trimethoprim, and Cefdinir    Current Medication List    Current Outpatient Medications:   •  fexofenadine (ALLEGRA) 180 MG tablet, Take 180 mg by mouth Daily., Disp: , Rfl:   •  NON FORMULARY, Estradiol 250 mg, progesterone 8 g, testosterone 300 mg drops compounded at [facility] (6 drops at night), Disp: , Rfl:   •  Semaglutide-Weight Management (Wegovy) 1.7 MG/0.75ML solution auto-injector, Inject 0.75 mL under the skin into the appropriate area as  "directed 1 (One) Time Per Week., Disp: 3 mL, Rfl: 0  •  spironolactone (ALDACTONE) 50 MG tablet, Take 1 tablet by mouth 2 times every day, Disp: 60 tablet, Rfl: 6  •  Thyroid ([place] Thyroid) 60 MG tablet, Take 1 tablet by mouth Daily., Disp: 30 tablet, Rfl: 6  •  Thyroid ([place] Thyroid) 60 MG tablet, Take 1 tablet by mouth every day, Disp: 30 tablet, Rfl: 6    Drug Interactions  Saxenda + Estradiol + Progesterone: hyperglycemia-Associated Agents may diminish the therapeutic effect of Antidiabetic Agents  Saxenda + Testosterone: Androgens may enhance the hypoglycemic effect of Agents with Blood Glucose Lowering Effects  No interreactions with Wegovy    Relevant Laboratory Values  Lab Results   Component Value Date    CHOL 188 12/05/2022    CHLPL 216 (H) 02/01/2016    TRIG 136 12/05/2022    HDL 43 12/05/2022     (H) 12/05/2022     There is no height or weight on file to calculate BMI.    Vaccinations:   Patient recommended to keep up with routine vaccinations.     Goals of Therapy  • Clinical Goals or Therapeutic Targets: 10% weight loss goal      Date 9/12/22 10/10/22 11/7/22 12/5/22 1/3/23 1/30/23 2/28/23   Weight (lb) 184.4 179.6 175.4 lb 173.2 169.8 165.8 lbs 161.8 lbs    BMI kg/ 32.66 31.81 31.06 30.68 30.07 29.37 28.66   Waist Circumference (in)  40.5 in 39.25\" 38\" 39\" 36\"  * will start Wegovy 1mg 36.25\" 36.5\"       Medication Assessment & Plan    Patient's current BMI is 28.66, which is considered overweight. Pt has lost 22.6 lbs since beginning. Patient congratulated on success thus far!     Will order Wegovy 2.4 mg SC weekly.     None of the patient's medications need adjusted based on the addition of Wegovy to medication list.     Discussed lifestyle modifications, including diet and exercise. Patient education provided.     Patient denies any s/sx of hypoglycemia.     Worked with patient to create SMART Goal(s): drink more water every day (64 oz). Pt wishes to continue this goal.     Will follow-up " with patient in 4 weeks, or sooner if needed.       Anahi Larkin. Alan, PharmD  2/28/2023  08:57 EST

## 2023-03-28 ENCOUNTER — DISEASE STATE MANAGEMENT VISIT (OUTPATIENT)
Dept: PHARMACY | Facility: HOSPITAL | Age: 53
End: 2023-03-28
Payer: COMMERCIAL

## 2023-03-28 VITALS
BODY MASS INDEX: 28.21 KG/M2 | SYSTOLIC BLOOD PRESSURE: 99 MMHG | HEIGHT: 63 IN | WEIGHT: 159.2 LBS | DIASTOLIC BLOOD PRESSURE: 70 MMHG | HEART RATE: 68 BPM

## 2023-03-28 RX ORDER — SEMAGLUTIDE 2.4 MG/.75ML
2.4 INJECTION, SOLUTION SUBCUTANEOUS WEEKLY
Qty: 3 ML | Refills: 0 | Status: SHIPPED | OUTPATIENT
Start: 2023-03-28

## 2023-03-28 NOTE — PROGRESS NOTES
Medication Management Clinic  Weight Management Program        Kristy Iqbal is a 53 y.o. female referred to the Medication Management Clinic by Ce Parra for clinical pharmacy and specialty pharmacy management of [facility] for weight management. Kristy Iqbal has previously tried gym, low carb diet, Noom, Saxenda, and Metabalife for weight loss. Patient had also initially tried Mounjaro prior to regulation changes.  The patient denies a personal history or family history of thyroid cancer, denies a personal history of pancreatitis, and denies a diagnosis of gastroparesis.Patient denies any swelling/lump/hoarseness, etc in the throat.       Patient is currently on Wegovy 2.4 mg. The patient reports tolerating the medication well, denies any missed doses and denies any issues providing self the injection. Patient reports that she does believe appetite suppression is working.       Relevant Past Medical History and Co-morbidities  Past Medical History:   Diagnosis Date   • Heart murmur     questionable, hx scarlet fever as child   • Thyroid disease      Social History     Socioeconomic History   • Marital status:    Tobacco Use   • Smoking status: Never   • Smokeless tobacco: Never   Substance and Sexual Activity   • Alcohol use: No   • Drug use: No       Allergies  Ampicillin, Codeine, Penicillins, Sulfa antibiotics, Trimethoprim, and Cefdinir    Current Medication List    Current Outpatient Medications:   •  fexofenadine (ALLEGRA) 180 MG tablet, Take 180 mg by mouth Daily., Disp: , Rfl:   •  NON FORMULARY, Estradiol 250 mg, progesterone 8 g, testosterone 300 mg drops compounded at [facility] (6 drops at night), Disp: , Rfl:   •  Semaglutide-Weight Management (Wegovy) 2.4 MG/0.75ML solution auto-injector, Inject 2.4 mg under the skin into the appropriate area as directed 1 (One) Time Per Week., Disp: 3 mL, Rfl: 0  •  spironolactone (ALDACTONE) 50 MG tablet, Take 1 tablet by mouth 2 times every  "day, Disp: 60 tablet, Rfl: 6  •  Thyroid ([place] Thyroid) 60 MG tablet, Take 1 tablet by mouth Daily., Disp: 30 tablet, Rfl: 6  •  Thyroid ([place] Thyroid) 60 MG tablet, Take 1 tablet by mouth every day, Disp: 30 tablet, Rfl: 6    Drug Interactions  Saxenda + Estradiol + Progesterone: hyperglycemia-Associated Agents may diminish the therapeutic effect of Antidiabetic Agents  Saxenda + Testosterone: Androgens may enhance the hypoglycemic effect of Agents with Blood Glucose Lowering Effects  No interreactions with Wegovy    Relevant Laboratory Values  Lab Results   Component Value Date    CHOL 188 12/05/2022    CHLPL 216 (H) 02/01/2016    TRIG 136 12/05/2022    HDL 43 12/05/2022     (H) 12/05/2022     There is no height or weight on file to calculate BMI.    Vaccinations:   Patient recommended to keep up with routine vaccinations.     Goals of Therapy  • Clinical Goals or Therapeutic Targets: 10% weight loss goal      Date 9/12/22 10/10/22 11/7/22 12/5/22 1/3/23 1/30/23 2/28/23 3/28/23   Weight (lb) 184.4 179.6 175.4 lb 173.2 169.8 165.8 lbs 161.8 lbs  159.2 lbs   BMI kg/ 32.66 31.81 31.06 30.68 30.07 29.37 28.66 28.20   Waist Circumference (in)  40.5 in 39.25\" 38\" 39\" 36\"  * will start Wegovy 1mg 36.25\" 36.5\" 35.5\"       Medication Assessment & Plan    Patient's current BMI is 28.66, which is considered overweight. Pt has lost 25.2 lbs and 5 inches since beginning. Patient congratulated on success thus far!     Will re-order Wegovy 2.4 mg SC weekly.     None of the patient's medications need adjusted based on the addition of Wegovy to medication list.     Discussed lifestyle modifications, including diet and exercise. Patient education provided.     Patient denies any s/sx of hypoglycemia.     Worked with patient to create SMART Goal(s): drink more water every day (64 oz). Pt wishes to continue this goal.     Will follow-up with patient in 4 weeks, or sooner if needed.       Anahi Larkin. Alan, " PharmD  3/28/2023  09:02 EDT

## 2023-04-25 ENCOUNTER — DISEASE STATE MANAGEMENT VISIT (OUTPATIENT)
Dept: PHARMACY | Facility: HOSPITAL | Age: 53
End: 2023-04-25
Payer: COMMERCIAL

## 2023-04-25 VITALS
BODY MASS INDEX: 27.53 KG/M2 | WEIGHT: 155.4 LBS | DIASTOLIC BLOOD PRESSURE: 69 MMHG | SYSTOLIC BLOOD PRESSURE: 101 MMHG | HEART RATE: 66 BPM

## 2023-04-25 RX ORDER — SEMAGLUTIDE 2.4 MG/.75ML
2.4 INJECTION, SOLUTION SUBCUTANEOUS WEEKLY
Qty: 3 ML | Refills: 0 | Status: SHIPPED | OUTPATIENT
Start: 2023-04-25

## 2023-04-25 NOTE — PROGRESS NOTES
Medication Management Clinic  Weight Management Program      Kristy Iqbal is a 53 y.o. female referred to the Medication Management Clinic by Ce Parra for clinical pharmacy and specialty pharmacy management of [facility] for weight management. Kristy Iqbal has previously tried gym, low carb diet, Noom, Saxenda, and Metabalife for weight loss. Patient had also initially tried Mounjaro prior to regulation changes.  The patient denies a personal history or family history of thyroid cancer, denies a personal history of pancreatitis, and denies a diagnosis of gastroparesis. Patient denies any swelling/lump/hoarseness, etc in the throat.      Patient is currently on Wegovy 2.4 mg. The patient reports tolerating the medication well, denies any missed doses and denies any issues providing self the injection.     Relevant Past Medical History and Co-morbidities  Past Medical History:   Diagnosis Date   • Heart murmur     questionable, hx scarlet fever as child   • Thyroid disease      Social History     Socioeconomic History   • Marital status:    Tobacco Use   • Smoking status: Never   • Smokeless tobacco: Never   Substance and Sexual Activity   • Alcohol use: No   • Drug use: No       Allergies  Ampicillin, Codeine, Penicillins, Sulfa antibiotics, Trimethoprim, and Cefdinir    Current Medication List    Current Outpatient Medications:   •  fexofenadine (ALLEGRA) 180 MG tablet, Take 1 tablet by mouth Daily., Disp: , Rfl:   •  NON FORMULARY, Estradiol 250 mg, progesterone 8 g, testosterone 300 mg drops compounded at [facility] (6 drops at night), Disp: , Rfl:   •  Semaglutide-Weight Management (Wegovy) 2.4 MG/0.75ML solution auto-injector, Inject 2.4 mg under the skin 1 (One) Time Per Week., Disp: 3 mL, Rfl: 0  •  spironolactone (ALDACTONE) 50 MG tablet, Take 1 tablet by mouth 2 times every day, Disp: 60 tablet, Rfl: 6  •  Thyroid ([place] Thyroid) 60 MG tablet, Take 1 tablet by mouth Daily., Disp: 30  "tablet, Rfl: 6  •  Thyroid ([place] Thyroid) 60 MG tablet, Take 1 tablet by mouth every day, Disp: 30 tablet, Rfl: 6    Drug Interactions  Saxenda + Estradiol + Progesterone: hyperglycemia-Associated Agents may diminish the therapeutic effect of Antidiabetic Agents  Saxenda + Testosterone: Androgens may enhance the hypoglycemic effect of Agents with Blood Glucose Lowering Effects  No interreactions with Wegovy    Relevant Laboratory Values  Lab Results   Component Value Date    CHOL 188 12/05/2022    CHLPL 216 (H) 02/01/2016    TRIG 136 12/05/2022    HDL 43 12/05/2022     (H) 12/05/2022     There is no height or weight on file to calculate BMI.    Vaccinations:   Patient recommended to keep up with routine vaccinations.     Goals of Therapy  • Clinical Goals or Therapeutic Targets: 10% weight loss goal      Date 9/12/22 10/10/22 11/7/22 12/5/22 1/3/23 1/30/23 2/28/23 3/28/23 4/25/23   Weight (lb) 184.4 179.6 175.4 lb 173.2 169.8 165.8 lbs 161.8 lbs  159.2 lbs 155.4lb    BMI kg/ 32.66 31.81 31.06 30.68 30.07 29.37 28.66 28.20 27.5   Waist Circumference (in)  40.5 in 39.25\" 38\" 39\" 36\"  * will start Wegovy 1mg 36.25\" 36.5\" 35.5\" 35\"       Medication Assessment & Plan    Patient's current BMI is 27.5, which is considered overweight. Pt has lost 29 lbs and 5.5 inches since beginning. Patient congratulated on success thus far!    Will re-order Wegovy 2.4 mg SC weekly.     Discussed lifestyle modifications, including diet and exercise. Patient education provided.     Worked with patient to create SMART Goal(s):   1. Drink at least 64 oz of water each day  2. Walk 3 days a week at home for at least 1 mile.     Consider labs at next visit.     Will follow-up with patient in 4 weeks, or sooner if needed.     Maria Shelby, Kvng  4/25/2023  09:05 EDT  "

## 2023-05-23 ENCOUNTER — DISEASE STATE MANAGEMENT VISIT (OUTPATIENT)
Dept: PHARMACY | Facility: HOSPITAL | Age: 53
End: 2023-05-23
Payer: COMMERCIAL

## 2023-05-23 VITALS
DIASTOLIC BLOOD PRESSURE: 73 MMHG | BODY MASS INDEX: 27 KG/M2 | HEART RATE: 61 BPM | SYSTOLIC BLOOD PRESSURE: 107 MMHG | WEIGHT: 152.4 LBS

## 2023-05-23 RX ORDER — SEMAGLUTIDE 2.4 MG/.75ML
2.4 INJECTION, SOLUTION SUBCUTANEOUS WEEKLY
Qty: 3 ML | Refills: 0 | Status: SHIPPED | OUTPATIENT
Start: 2023-05-23

## 2023-05-23 NOTE — PROGRESS NOTES
Medication Management Clinic  Weight Management Program      Kristy Iqbal is a 53 y.o. female referred to the Medication Management Clinic by Ce Parra for clinical pharmacy and specialty pharmacy management of [facility] for weight management. Kristy Iqbal has previously tried gym, low carb diet, Noom, Saxenda, and Metabalife for weight loss. Patient had also initially tried Mounjaro prior to regulation changes.  The patient denies a personal history or family history of thyroid cancer, denies a personal history of pancreatitis, and denies a diagnosis of gastroparesis. Patient denies any swelling/lump/hoarseness, etc in the throat or severe abdominal pain.     Patient is currently on Wegovy 2.4 mg. The patient reports tolerating the medication well, denies any missed doses and denies any issues providing self the injection. She reports occasional nausea when she needs to eat or drink but nothing that is troubling her. She states the medication is still working pretty well to curb appetite. She is working toward her goals, doing well with walking, but not hitting her water goal yet.    Relevant Past Medical History and Co-morbidities  Past Medical History:   Diagnosis Date   • Heart murmur     questionable, hx scarlet fever as child   • Thyroid disease      Social History     Socioeconomic History   • Marital status:    Tobacco Use   • Smoking status: Never   • Smokeless tobacco: Never   Substance and Sexual Activity   • Alcohol use: No   • Drug use: No       Allergies  Ampicillin, Codeine, Penicillins, Sulfa antibiotics, Trimethoprim, and Cefdinir    Current Medication List    Current Outpatient Medications:   •  fexofenadine (ALLEGRA) 180 MG tablet, Take 1 tablet by mouth Daily., Disp: , Rfl:   •  NON FORMULARY, Estradiol 250 mg, progesterone 8 g, testosterone 300 mg drops compounded at [facility] (6 drops at night), Disp: , Rfl:   •  Semaglutide-Weight Management (Wegovy) 2.4 MG/0.75ML solution  "auto-injector, Inject 2.4 mg under the skin 1 (One) Time Per Week., Disp: 3 mL, Rfl: 0  •  spironolactone (ALDACTONE) 50 MG tablet, Take 1 tablet by mouth 2 times every day, Disp: 60 tablet, Rfl: 6  •  Thyroid ([place] Thyroid) 60 MG tablet, Take 1 tablet by mouth Daily., Disp: 30 tablet, Rfl: 6  •  Thyroid ([place] Thyroid) 60 MG tablet, Take 1 tablet by mouth every day, Disp: 30 tablet, Rfl: 6    Drug Interactions  Saxenda + Estradiol + Progesterone: hyperglycemia-Associated Agents may diminish the therapeutic effect of Antidiabetic Agents  Saxenda + Testosterone: Androgens may enhance the hypoglycemic effect of Agents with Blood Glucose Lowering Effects  No interreactions with Wegovy    Relevant Laboratory Values  Lab Results   Component Value Date    CHOL 188 12/05/2022    CHLPL 216 (H) 02/01/2016    TRIG 136 12/05/2022    HDL 43 12/05/2022     (H) 12/05/2022     Body mass index is 27 kg/m².    Vaccinations:   Patient recommended to keep up with routine vaccinations.     Goals of Therapy  • Clinical Goals or Therapeutic Targets: 10% weight loss goal      Date 9/12/22 10/10/22 11/7/22 12/5/22 1/3/23 1/30/23 2/28/23 3/28/23 4/25/23 5/23/23   Weight (lb) 184.4 179.6 175.4 lb 173.2 169.8 165.8 lbs 161.8 lbs  159.2 lbs 155.4lb  152.4 lbs   BMI kg/ 32.66 31.81 31.06 30.68 30.07 29.37 28.66 28.20 27.5 27   Waist Circumference (in)  40.5 in 39.25\" 38\" 39\" 36\"  * will start Wegovy 1mg 36.25\" 36.5\" 35.5\" 35\" 35\"       Medication Assessment & Plan    Patient's current BMI is 27, which is considered overweight. Pt has lost 31 lbs and 5.5 inches overall. Patient congratulated on success thus far!    Will re-order Wegovy 2.4 mg SC weekly.     Discussed lifestyle modifications, including diet and exercise. Patient education provided.     Worked with patient to create SMART Goal(s): continue these goals  1. Drink at least 64 oz of water each day  2. Walk 3 days a week at home for at least 1 mile.     Consider labs at next " visit.     Will follow-up with patient in 4 weeks, or sooner if needed.     Yudy Parra [facility]  5/23/2023  09:06 EDT

## 2023-08-15 ENCOUNTER — DISEASE STATE MANAGEMENT VISIT (OUTPATIENT)
Dept: PHARMACY | Facility: HOSPITAL | Age: 53
End: 2023-08-15
Payer: COMMERCIAL

## 2023-08-15 VITALS
DIASTOLIC BLOOD PRESSURE: 70 MMHG | WEIGHT: 143.2 LBS | HEIGHT: 63 IN | HEART RATE: 61 BPM | BODY MASS INDEX: 25.37 KG/M2 | SYSTOLIC BLOOD PRESSURE: 105 MMHG

## 2023-08-15 RX ORDER — SEMAGLUTIDE 2.4 MG/.75ML
2.4 INJECTION, SOLUTION SUBCUTANEOUS WEEKLY
Qty: 3 ML | Refills: 0 | Status: SHIPPED | OUTPATIENT
Start: 2023-08-15

## 2023-08-15 NOTE — PROGRESS NOTES
[facility]  Weight Management Program      Kristy Iqbal is a 53 y.o. female referred to the [facility] by Ce Parra for clinical pharmacy and specialty pharmacy management of GLP1 for weight management. Kristy Iqbal has previously tried gym, low carb diet, Noom, Saxenda, and Metabalife for weight loss. Patient had also initially tried Mounjaro prior to regulation changes.  The patient denies a personal history or family history of thyroid cancer, denies a personal history of pancreatitis, and denies a diagnosis of gastroparesis. Patient denies any swelling/lump/hoarseness, etc in the throat or severe abdominal pain.     Patient is currently on Wegovy 2.4 mg weekly. Patient denies any lumps/swelling/hoarseness in neck/throat or abdominal pain. Patient reports tolerating medication well without any side effects. Patient denies any trouble giving injection or any missed doses. Patient reports that she gets close to meeting her water intake goal most days and she is consistently meeting her walking goal. She would like to continue with these goals for now.      Relevant Past Medical History and Co-morbidities  Past Medical History:   Diagnosis Date    Heart murmur     questionable, hx scarlet fever as child    Thyroid disease      Social History     Socioeconomic History    Marital status:    Tobacco Use    Smoking status: Never    Smokeless tobacco: Never   Substance and Sexual Activity    Alcohol use: No    Drug use: No       Allergies  Ampicillin, Codeine, Penicillins, Sulfa antibiotics, Trimethoprim, and Cefdinir    Current Medication List    Current Outpatient Medications:     fexofenadine (ALLEGRA) 180 MG tablet, Take 1 tablet by mouth Daily., Disp: , Rfl:     NON FORMULARY, Estradiol 250 mg, progesterone 8 g, testosterone 300 mg drops compounded at [facility] (6 drops at night), Disp: , Rfl:     Semaglutide-Weight Management (Wegovy) 2.4 MG/0.75ML  "solution auto-injector, Inject 2.4 mg under the skin into the appropriate area as directed 1 (One) Time Per Week., Disp: 3 mL, Rfl: 0    spironolactone (ALDACTONE) 50 MG tablet, Take 1 tablet by mouth 2 times every day, Disp: 60 tablet, Rfl: 6    Thyroid ([place] Thyroid) 60 MG tablet, Take 1 tablet by mouth Daily., Disp: 30 tablet, Rfl: 6    Thyroid ([place] Thyroid) 60 MG tablet, Take 1 tablet by mouth every day, Disp: 30 tablet, Rfl: 6    Drug Interactions  Saxenda + Estradiol + Progesterone: hyperglycemia-Associated Agents may diminish the therapeutic effect of Antidiabetic Agents  Saxenda + Testosterone: Androgens may enhance the hypoglycemic effect of Agents with Blood Glucose Lowering Effects  No interreactions with Wegovy    Relevant Laboratory Values  Lab Results   Component Value Date    CHOL 188 12/05/2022    CHLPL 216 (H) 02/01/2016    TRIG 136 12/05/2022    HDL 43 12/05/2022     (H) 12/05/2022     There is no height or weight on file to calculate BMI.    Vaccinations:   Patient recommended to keep up with routine vaccinations.     Goals of Therapy  Clinical Goals or Therapeutic Targets: 10% weight loss goal      Date 9/12/22 10/10/22 11/7/22 12/5/22 1/3/23 1/30/23 2/28/23 3/28/23 4/25/23 5/23/23   Weight (lb) 184.4 179.6 175.4 lb 173.2 169.8 165.8 lbs 161.8 lbs  159.2 lbs 155.4lb  152.4 lbs   BMI kg/ 32.66 31.81 31.06 30.68 30.07 29.37 28.66 28.20 27.5 27   Waist Circumference (in)  40.5 in 39.25\" 38\" 39\" 36\"  * will start Wegovy 1mg 36.25\" 36.5\" 35.5\" 35\" 35\"     Date 6/20/23 7/18/23 8/15/23   Weight (lb) 148 lbs 145.4 lb 143.2 lb   BMI kg/ 26.22 25.76 25.37   Waist Circumference (in)  34\" 32.9\" 32.5\"       Medication Assessment & Plan    Patient's current BMI is 25.37, which is considered overweight. Pt has lost 41.2 lbs and 8 inches overall. Patient congratulated on success thus far!    Will re-order Wegovy 2.4 mg SC weekly.     Discussed lifestyle modifications, including diet and exercise. " Patient education provided.     Patient planning to have labs drawn this week. Orders placed at previous visit.    Worked with patient to create SMART Goal(s): continue these goals  1. Drink at least 64 oz of water each day  2. Walk 3 days a week at home for at least 1 mile.     Will follow-up with patient in 4 weeks, or sooner if needed.     Lela Ashton, LiliamD  8/15/2023  09:59 EDT

## 2023-09-09 ENCOUNTER — LAB (OUTPATIENT)
Dept: LAB | Facility: HOSPITAL | Age: 53
End: 2023-09-09
Payer: COMMERCIAL

## 2023-09-09 DIAGNOSIS — E66.9 OBESITY, CLASS I, BMI 30-34.9: ICD-10-CM

## 2023-09-09 LAB
ALBUMIN SERPL-MCNC: 4.2 G/DL (ref 3.5–5.2)
ALBUMIN/GLOB SERPL: 1.5 G/DL
ALP SERPL-CCNC: 44 U/L (ref 39–117)
ALT SERPL W P-5'-P-CCNC: 9 U/L (ref 1–33)
ANION GAP SERPL CALCULATED.3IONS-SCNC: 10.9 MMOL/L (ref 5–15)
AST SERPL-CCNC: 15 U/L (ref 1–32)
BILIRUB SERPL-MCNC: 0.3 MG/DL (ref 0–1.2)
BUN SERPL-MCNC: 19 MG/DL (ref 6–20)
BUN/CREAT SERPL: 18.4 (ref 7–25)
CALCIUM SPEC-SCNC: 9.4 MG/DL (ref 8.6–10.5)
CHLORIDE SERPL-SCNC: 105 MMOL/L (ref 98–107)
CHOLEST SERPL-MCNC: 188 MG/DL (ref 0–200)
CO2 SERPL-SCNC: 22.1 MMOL/L (ref 22–29)
CREAT SERPL-MCNC: 1.03 MG/DL (ref 0.57–1)
EGFRCR SERPLBLD CKD-EPI 2021: 64.3 ML/MIN/1.73
GLOBULIN UR ELPH-MCNC: 2.8 GM/DL
GLUCOSE SERPL-MCNC: 74 MG/DL (ref 65–99)
HBA1C MFR BLD: 5 % (ref 4.8–5.6)
HDLC SERPL-MCNC: 49 MG/DL (ref 40–60)
LDLC SERPL CALC-MCNC: 125 MG/DL (ref 0–100)
LDLC/HDLC SERPL: 2.53 {RATIO}
POTASSIUM SERPL-SCNC: 4.5 MMOL/L (ref 3.5–5.2)
PROT SERPL-MCNC: 7 G/DL (ref 6–8.5)
SODIUM SERPL-SCNC: 138 MMOL/L (ref 136–145)
T-UPTAKE NFR SERPL: 1.17 TBI (ref 0.8–1.3)
T4 SERPL-MCNC: 5.48 MCG/DL (ref 4.5–11.7)
TRIGL SERPL-MCNC: 75 MG/DL (ref 0–150)
TSH SERPL DL<=0.05 MIU/L-ACNC: 1.47 UIU/ML (ref 0.27–4.2)
VLDLC SERPL-MCNC: 14 MG/DL (ref 5–40)

## 2023-09-09 PROCEDURE — 36415 COLL VENOUS BLD VENIPUNCTURE: CPT

## 2023-09-09 PROCEDURE — 84436 ASSAY OF TOTAL THYROXINE: CPT

## 2023-09-09 PROCEDURE — 84443 ASSAY THYROID STIM HORMONE: CPT

## 2023-09-09 PROCEDURE — 80061 LIPID PANEL: CPT

## 2023-09-09 PROCEDURE — 80053 COMPREHEN METABOLIC PANEL: CPT

## 2023-09-09 PROCEDURE — 84479 ASSAY OF THYROID (T3 OR T4): CPT

## 2023-09-09 PROCEDURE — 83036 HEMOGLOBIN GLYCOSYLATED A1C: CPT

## 2023-09-12 ENCOUNTER — DISEASE STATE MANAGEMENT VISIT (OUTPATIENT)
Dept: PHARMACY | Facility: HOSPITAL | Age: 53
End: 2023-09-12
Payer: COMMERCIAL

## 2023-09-12 VITALS
SYSTOLIC BLOOD PRESSURE: 106 MMHG | BODY MASS INDEX: 25.05 KG/M2 | DIASTOLIC BLOOD PRESSURE: 64 MMHG | HEART RATE: 59 BPM | WEIGHT: 141.4 LBS

## 2023-09-12 RX ORDER — SEMAGLUTIDE 1.7 MG/.75ML
1.7 INJECTION, SOLUTION SUBCUTANEOUS WEEKLY
Qty: 3 ML | Refills: 0 | Status: SHIPPED | OUTPATIENT
Start: 2023-09-12

## 2023-09-12 RX ORDER — SEMAGLUTIDE 1.7 MG/.75ML
1.7 INJECTION, SOLUTION SUBCUTANEOUS WEEKLY
Qty: 3 ML | Refills: 0 | Status: SHIPPED | OUTPATIENT
Start: 2023-09-12 | End: 2023-09-12 | Stop reason: SDUPTHER

## 2023-09-12 NOTE — PROGRESS NOTES
[facility]  Weight Management Program      Kristy Iqbal is a 53 y.o. female referred to the [facility] by Ce Parra for clinical pharmacy and specialty pharmacy management of GLP1 for weight management. Kristy Iqbal has previously tried gym, low carb diet, Noom, Saxenda, Mounjaro and Metabalife for weight loss. The patient denies a personal history or family history of thyroid cancer, denies a personal history of pancreatitis, and denies a diagnosis of gastroparesis. Patient denies any swelling/lump/hoarseness, etc in the throat or severe abdominal pain.     Patient is currently on Wegovy 2.4 mg weekly. Patient reports tolerating medication well with only mild nausea. She denies constipation, diarrhea, vomiting or severe abdominal pain. Patient denies any trouble giving injection or any missed doses. Patient reports that she gets close to meeting her water intake goal most days and she is consistently meeting her walking goal. She is interested in maintaining her current weight and is interested on switching to a maintenace dose of 1.7mg weekly.       Relevant Past Medical History and Co-morbidities  Past Medical History:   Diagnosis Date    Heart murmur     questionable, hx scarlet fever as child    Thyroid disease      Social History     Socioeconomic History    Marital status:    Tobacco Use    Smoking status: Never    Smokeless tobacco: Never   Substance and Sexual Activity    Alcohol use: No    Drug use: No       Allergies  Ampicillin, Codeine, Penicillins, Sulfa antibiotics, Trimethoprim, and Cefdinir    Current Medication List    Current Outpatient Medications:     NON FORMULARY, Estradiol 250 mg, progesterone 8 g, testosterone 300 mg drops compounded at [facility] (6 drops at night), Disp: , Rfl:     Semaglutide-Weight Management (Wegovy) 2.4 MG/0.75ML solution auto-injector, Inject 2.4 mg under the skin into the appropriate area as directed 1  "(One) Time Per Week., Disp: 3 mL, Rfl: 0    spironolactone (ALDACTONE) 50 MG tablet, Take 1 tablet by mouth 2 times every day, Disp: 60 tablet, Rfl: 6    Thyroid ([place] Thyroid) 60 MG tablet, Take 1 tablet by mouth Daily., Disp: 30 tablet, Rfl: 6    Drug Interactions  Saxenda + Estradiol + Progesterone: hyperglycemia-Associated Agents may diminish the therapeutic effect of Antidiabetic Agents  Saxenda + Testosterone: Androgens may enhance the hypoglycemic effect of Agents with Blood Glucose Lowering Effects  No interreactions with Wegovy    Relevant Laboratory Values  Lab Results   Component Value Date    CHOL 188 09/09/2023    CHLPL 216 (H) 02/01/2016    TRIG 75 09/09/2023    HDL 49 09/09/2023     (H) 09/09/2023     There is no height or weight on file to calculate BMI.    Vaccinations:   Patient recommended to keep up with routine vaccinations.     Goals of Therapy  Clinical Goals or Therapeutic Targets: 10% weight loss goal      Date 9/12/22 10/10/22 11/7/22 12/5/22 1/3/23 1/30/23 2/28/23 3/28/23 4/25/23 5/23/23   Weight (lb) 184.4 179.6 175.4 lb 173.2 169.8 165.8 lbs 161.8 lbs  159.2 lbs 155.4lb  152.4 lbs   BMI kg/ 32.66 31.81 31.06 30.68 30.07 29.37 28.66 28.20 27.5 27   Waist Circumference (in)  40.5 in 39.25\" 38\" 39\" 36\"  * will start Wegovy 1mg 36.25\" 36.5\" 35.5\" 35\" 35\"     Date 6/20/23 7/18/23 8/15/23 9/12/23   Weight (lb) 148 lbs 145.4 lb 143.2 lb 141.4lb   BMI kg/ 26.22 25.76 25.37 25.05   Waist Circumference (in)  34\" 32.9\" 32.5\" 32.5\"       Medication Assessment & Plan    Patient's current BMI is 25.05, which is considered overweight. Pt has lost 43 lbs overall. Patient congratulated on success thus far!    Will order Wegovy 1.7 mg SC weekly.     Discussed lifestyle modifications, including diet and exercise. Patient education provided.     Patient planning to have labs drawn this week. Orders placed at previous visit.    Worked with patient to create SMART Goal(s): continue these goals  1. " Drink at least 64 oz of water each day  2. Walk 3 days a week at home for at least 1 mile.     Reviewed and discussed lab results with her.  Emphasized the need for adequate hydration with water and recommended we keep watching creatinine and lipid panel.     Will follow-up with patient in 4 weeks, or sooner if needed.     Maria Shelby, PharmD  9/12/2023  10:09 EDT

## 2023-10-10 ENCOUNTER — DISEASE STATE MANAGEMENT VISIT (OUTPATIENT)
Dept: PHARMACY | Facility: HOSPITAL | Age: 53
End: 2023-10-10
Payer: COMMERCIAL

## 2023-10-10 VITALS
SYSTOLIC BLOOD PRESSURE: 105 MMHG | WEIGHT: 141.2 LBS | HEART RATE: 67 BPM | DIASTOLIC BLOOD PRESSURE: 71 MMHG | HEIGHT: 63 IN | BODY MASS INDEX: 25.02 KG/M2

## 2023-10-10 RX ORDER — SEMAGLUTIDE 2.4 MG/.75ML
2.4 INJECTION, SOLUTION SUBCUTANEOUS WEEKLY
Qty: 3 ML | Refills: 0 | Status: SHIPPED | OUTPATIENT
Start: 2023-10-10

## 2023-10-10 NOTE — PROGRESS NOTES
[facility]  Weight Management Program      Kristy Iqbal is a 53 y.o. female referred to the [facility] by Ce Parra for clinical pharmacy and specialty pharmacy management of GLP1 for weight management. Kristy Iqbal has previously tried gym, low carb diet, Noom, Saxenda, Mounjaro and Metabalife for weight loss. The patient denies a personal history or family history of thyroid cancer, denies a personal history of pancreatitis, and denies a diagnosis of gastroparesis. Patient denies any swelling/lump/hoarseness, etc in the throat or severe abdominal pain.     Patient is currently on Wegovy 1.7 mg weekly. Patient reports tolerating medication well with only mild nausea. She denies constipation, diarrhea, vomiting or severe abdominal pain. Patient denies any trouble giving injection or any missed doses. Patient reports that she gets close to meeting her water intake goal most days and she is consistently meeting her walking goal. Patient reports she is really trying to target walk goal. She is interested in maintaining her current weight and had wanted to try to decrease to the 1.7 mg on last visit for maintenance but reported today that she was a lot hungrier and prefers to go back to the 2.4 mg for maintenance.       Relevant Past Medical History and Co-morbidities  Past Medical History:   Diagnosis Date    Heart murmur     questionable, hx scarlet fever as child    Thyroid disease      Social History     Socioeconomic History    Marital status:    Tobacco Use    Smoking status: Never    Smokeless tobacco: Never   Substance and Sexual Activity    Alcohol use: No    Drug use: No       Allergies  Ampicillin, Codeine, Penicillins, Sulfa antibiotics, Trimethoprim, and Cefdinir    Current Medication List    Current Outpatient Medications:     NON FORMULARY, Estradiol 250 mg, progesterone 8 g, testosterone 300 mg drops compounded at [facility] (6 drops at night),  "Disp: , Rfl:     Semaglutide-Weight Management (Wegovy) 1.7 MG/0.75ML solution auto-injector, Inject 0.75 mL under the skin 1 (One) Time Per Week., Disp: 3 mL, Rfl: 0    spironolactone (ALDACTONE) 50 MG tablet, Take 1 tablet by mouth 2 times every day, Disp: 60 tablet, Rfl: 6    Thyroid ([place] Thyroid) 60 MG tablet, Take 1 tablet by mouth Daily., Disp: 30 tablet, Rfl: 6    Drug Interactions  Saxenda + Estradiol + Progesterone: hyperglycemia-Associated Agents may diminish the therapeutic effect of Antidiabetic Agents  Saxenda + Testosterone: Androgens may enhance the hypoglycemic effect of Agents with Blood Glucose Lowering Effects  No interreactions with Wegovy    Relevant Laboratory Values  Lab Results   Component Value Date    CHOL 188 09/09/2023    CHLPL 216 (H) 02/01/2016    TRIG 75 09/09/2023    HDL 49 09/09/2023     (H) 09/09/2023     There is no height or weight on file to calculate BMI.    Vaccinations:   Patient recommended to keep up with routine vaccinations.     Goals of Therapy  Clinical Goals or Therapeutic Targets: 10% weight loss goal      Date 9/12/22 10/10/22 11/7/22 12/5/22 1/3/23 1/30/23 2/28/23 3/28/23 4/25/23 5/23/23   Weight (lb) 184.4 179.6 175.4 lb 173.2 169.8 165.8 lbs 161.8 lbs  159.2 lbs 155.4lb  152.4 lbs   BMI kg/ 32.66 31.81 31.06 30.68 30.07 29.37 28.66 28.20 27.5 27   Waist Circumference (in)  40.5 in 39.25\" 38\" 39\" 36\"  * will start Wegovy 1mg 36.25\" 36.5\" 35.5\" 35\" 35\"     Date 6/20/23 7/18/23 8/15/23 9/12/23 10/10/23   Weight (lb) 148 lbs 145.4 lb 143.2 lb 141.4lb 141.2 lbs   BMI kg/ 26.22 25.76 25.37 25.05 25.01   Waist Circumference (in)  34\" 32.9\" 32.5\" 32.5\" 33.5\"       Medication Assessment & Plan    Patient's current BMI is 25.01, which is considered overweight. Pt has lost 43.2 lbs overall. Patient congratulated on success thus far!    Will order Wegovy 2.4 mg SC weekly.     Discussed lifestyle modifications, including diet and exercise. Patient education provided. "       Worked with patient to create SMART Goal(s): continue these goals  1. Drink at least 64 oz of water each day  2. Walk 3 days a week at home for at least 1 mile.     Reviewed and discussed lab results with her.  Emphasized the need for adequate hydration with water and recommended we keep watching creatinine and lipid panel.     Will follow-up with patient in 4 weeks, or sooner if needed.     Anahi Larkin. Alan, PharmABBY  10/10/2023  09:58 EDT

## 2023-10-24 ENCOUNTER — TRANSCRIBE ORDERS (OUTPATIENT)
Dept: ADMINISTRATIVE | Facility: HOSPITAL | Age: 53
End: 2023-10-24
Payer: COMMERCIAL

## 2023-10-24 DIAGNOSIS — Z12.31 VISIT FOR SCREENING MAMMOGRAM: Primary | ICD-10-CM

## 2023-11-07 ENCOUNTER — DISEASE STATE MANAGEMENT VISIT (OUTPATIENT)
Dept: PHARMACY | Facility: HOSPITAL | Age: 53
End: 2023-11-07
Payer: COMMERCIAL

## 2023-11-07 VITALS
BODY MASS INDEX: 25.02 KG/M2 | HEIGHT: 63 IN | SYSTOLIC BLOOD PRESSURE: 99 MMHG | DIASTOLIC BLOOD PRESSURE: 54 MMHG | HEART RATE: 61 BPM | WEIGHT: 141.2 LBS

## 2023-11-07 RX ORDER — SEMAGLUTIDE 2.4 MG/.75ML
2.4 INJECTION, SOLUTION SUBCUTANEOUS WEEKLY
Qty: 3 ML | Refills: 0 | Status: SHIPPED | OUTPATIENT
Start: 2023-11-07

## 2023-11-07 NOTE — PROGRESS NOTES
[facility]  Weight Management Program      Kristy Iqbal is a 53 y.o. female referred to the [facility] by Ce Parra for clinical pharmacy and specialty pharmacy management of GLP1 for weight management. Kristy Iqbal has previously tried gym, low carb diet, Noom, Saxenda, Mounjaro and Metabalife for weight loss. The patient denies a personal history or family history of thyroid cancer, denies a personal history of pancreatitis, and denies a diagnosis of gastroparesis. Patient denies any swelling/lump/hoarseness, etc in the throat or severe abdominal pain.     Patient is currently on Wegovy 2.4 mg weekly. Patient denies any lumps/swelling/hoarseness in neck/throat or abdominal pain. Patient reports tolerating medication well without any side effects. Patient denies any trouble giving injection or any missed doses. She states that she is happy with her current weight and is trying to maintain at this point. She feels more appetite suppression since increasing back to the 2.4 mg dose. She reports that she gets pretty close to her water intake goal and she is still walking as much as she can. She would like to keep targeting these goals for now.        Relevant Past Medical History and Co-morbidities  Past Medical History:   Diagnosis Date    Heart murmur     questionable, hx scarlet fever as child    Thyroid disease      Social History     Socioeconomic History    Marital status:    Tobacco Use    Smoking status: Never    Smokeless tobacco: Never   Substance and Sexual Activity    Alcohol use: No    Drug use: No       Allergies  Ampicillin, Codeine, Penicillins, Sulfa antibiotics, Trimethoprim, and Cefdinir    Current Medication List    Current Outpatient Medications:     NON FORMULARY, Estradiol 250 mg, progesterone 8 g, testosterone 300 mg drops compounded at [facility] (6 drops at night), Disp: , Rfl:     Semaglutide-Weight Management (Wegovy) 2.4 MG/0.75ML  "solution auto-injector, Inject 2.4 mg under the skin into the appropriate area as directed 1 (One) Time Per Week., Disp: 3 mL, Rfl: 0    spironolactone (ALDACTONE) 50 MG tablet, Take 1 tablet by mouth 2 times every day, Disp: 60 tablet, Rfl: 6    Thyroid ([place] Thyroid) 60 MG tablet, Take 1 tablet by mouth Daily., Disp: 30 tablet, Rfl: 6    Drug Interactions  Saxenda + Estradiol + Progesterone: hyperglycemia-Associated Agents may diminish the therapeutic effect of Antidiabetic Agents  Saxenda + Testosterone: Androgens may enhance the hypoglycemic effect of Agents with Blood Glucose Lowering Effects  No interreactions with Wegovy    Relevant Laboratory Values  Lab Results   Component Value Date    CHOL 188 09/09/2023    CHLPL 216 (H) 02/01/2016    TRIG 75 09/09/2023    HDL 49 09/09/2023     (H) 09/09/2023     Body mass index is 25.01 kg/m².    Vaccinations:   Patient recommended to keep up with routine vaccinations.     Goals of Therapy  Clinical Goals or Therapeutic Targets: 10% weight loss goal      Date 9/12/22 10/10/22 11/7/22 12/5/22 1/3/23 1/30/23 2/28/23 3/28/23 4/25/23 5/23/23   Weight (lb) 184.4 179.6 175.4 lb 173.2 169.8 165.8 lbs 161.8 lbs  159.2 lbs 155.4lb  152.4 lbs   BMI kg/ 32.66 31.81 31.06 30.68 30.07 29.37 28.66 28.20 27.5 27   Waist Circumference (in)  40.5 in 39.25\" 38\" 39\" 36\"  * will start Wegovy 1mg 36.25\" 36.5\" 35.5\" 35\" 35\"     Date 6/20/23 7/18/23 8/15/23 9/12/23 10/10/23 11/7/23   Weight (lb) 148 lbs 145.4 lb 143.2 lb 141.4lb 141.2 lbs 141.2 lb   BMI kg/ 26.22 25.76 25.37 25.05 25.01 25.01   Waist Circumference (in)  34\" 32.9\" 32.5\" 32.5\" 33.5\" 34.5\"       Medication Assessment & Plan    Patient's current BMI is 25.01, which is considered overweight. Pt has lost 43.2 lbs overall. Patient congratulated on success thus far!    Will order Wegovy 2.4 mg SC weekly.     Discussed lifestyle modifications, including diet and exercise. Patient education provided.     Worked with patient to " create SMART Goal(s): continue these goals  1. Drink at least 64 oz of water each day  2. Walk 3 days a week at home for at least 1 mile.     Will follow-up with patient in 4 weeks, or sooner if needed.     Lela Ashton, PharmD  11/7/2023  10:01 EST

## 2023-11-16 ENCOUNTER — HOSPITAL ENCOUNTER (OUTPATIENT)
Dept: MAMMOGRAPHY | Facility: HOSPITAL | Age: 53
Discharge: HOME | End: 2023-11-16
Admitting: NURSE PRACTITIONER
Payer: COMMERCIAL

## 2023-11-16 DIAGNOSIS — Z12.31 VISIT FOR SCREENING MAMMOGRAM: ICD-10-CM

## 2023-11-16 PROCEDURE — 77063 BREAST TOMOSYNTHESIS BI: CPT

## 2023-11-16 PROCEDURE — 77067 SCR MAMMO BI INCL CAD: CPT

## 2023-12-05 ENCOUNTER — DISEASE STATE MANAGEMENT VISIT (OUTPATIENT)
Dept: PHARMACY | Facility: HOSPITAL | Age: 53
End: 2023-12-05
Payer: COMMERCIAL

## 2023-12-05 VITALS
BODY MASS INDEX: 24.88 KG/M2 | HEART RATE: 78 BPM | DIASTOLIC BLOOD PRESSURE: 56 MMHG | WEIGHT: 140.4 LBS | SYSTOLIC BLOOD PRESSURE: 113 MMHG | HEIGHT: 63 IN

## 2023-12-05 RX ORDER — SEMAGLUTIDE 2.4 MG/.75ML
2.4 INJECTION, SOLUTION SUBCUTANEOUS WEEKLY
Qty: 3 ML | Refills: 0 | Status: SHIPPED | OUTPATIENT
Start: 2023-12-05

## 2023-12-05 NOTE — PROGRESS NOTES
[facility]  Weight Management Program      Kristy Iqbal is a 53 y.o. female referred to the [facility] by Ce Parra for clinical pharmacy and specialty pharmacy management of GLP1 for weight management. Kristy Iqbal has previously tried gym, low carb diet, Noom, Saxenda, Mounjaro and Metabalife for weight loss. The patient denies a personal history or family history of thyroid cancer, denies a personal history of pancreatitis, and denies a diagnosis of gastroparesis. Patient denies any swelling/lump/hoarseness, etc in the throat or severe abdominal pain.     Patient is currently on Wegovy 2.4 mg weekly. Patient denies any lumps/swelling/hoarseness in neck/throat or abdominal pain. Patient reports tolerating medication well without any side effects. Patient denies any trouble giving injection or any missed doses. She states that she is happy with her current weight and is trying to maintain at this point. She feels more appetite suppression since increasing back to the 2.4 mg dose. She reports that she meets her water intake goal sometimes but doesn't always do so well with it. She has been meeting her walking goal. She would like to continue targeting these goals.      Relevant Past Medical History and Co-morbidities  Past Medical History:   Diagnosis Date    Heart murmur     questionable, hx scarlet fever as child    Thyroid disease      Social History     Socioeconomic History    Marital status:    Tobacco Use    Smoking status: Never    Smokeless tobacco: Never   Substance and Sexual Activity    Alcohol use: No    Drug use: No       Allergies  Ampicillin, Codeine, Penicillins, Sulfa antibiotics, Trimethoprim, and Cefdinir    Current Medication List    Current Outpatient Medications:     NON FORMULARY, Estradiol 250 mg, progesterone 8 g, testosterone 300 mg drops compounded at [facility] (6 drops at night), Disp: , Rfl:     Semaglutide-Weight Management  "(Wegovy) 2.4 MG/0.75ML solution auto-injector, Inject 2.4 mg under the skin into the appropriate area as directed 1 (One) Time Per Week., Disp: 3 mL, Rfl: 0    spironolactone (ALDACTONE) 50 MG tablet, Take 1 tablet by mouth 2 times every day, Disp: 60 tablet, Rfl: 6    spironolactone (ALDACTONE) 50 MG tablet, Take 1 tablet by mouth 2 times every day, Disp: 60 tablet, Rfl: 6    Thyroid ([place] Thyroid) 60 MG tablet, Take 1 tablet by mouth Daily., Disp: 30 tablet, Rfl: 6    Drug Interactions  Saxenda + Estradiol + Progesterone: hyperglycemia-Associated Agents may diminish the therapeutic effect of Antidiabetic Agents  Saxenda + Testosterone: Androgens may enhance the hypoglycemic effect of Agents with Blood Glucose Lowering Effects  No interreactions with Wegovy    Relevant Laboratory Values  Lab Results   Component Value Date    CHOL 188 09/09/2023    CHLPL 216 (H) 02/01/2016    TRIG 75 09/09/2023    HDL 49 09/09/2023     (H) 09/09/2023     There is no height or weight on file to calculate BMI.    Vaccinations:   Patient recommended to keep up with routine vaccinations.     Goals of Therapy  Clinical Goals or Therapeutic Targets: 10% weight loss goal      Date 9/12/22 10/10/22 11/7/22 12/5/22 1/3/23 1/30/23 2/28/23 3/28/23 4/25/23 5/23/23   Weight (lb) 184.4 179.6 175.4 lb 173.2 169.8 165.8 lbs 161.8 lbs  159.2 lbs 155.4lb  152.4 lbs   BMI kg/ 32.66 31.81 31.06 30.68 30.07 29.37 28.66 28.20 27.5 27   Waist Circumference (in)  40.5 in 39.25\" 38\" 39\" 36\"  * will start Wegovy 1mg 36.25\" 36.5\" 35.5\" 35\" 35\"     Date 6/20/23 7/18/23 8/15/23 9/12/23 10/10/23 11/7/23 12/5/23   Weight (lb) 148 lbs 145.4 lb 143.2 lb 141.4lb 141.2 lbs 141.2 lb 140.4 lb   BMI kg/ 26.22 25.76 25.37 25.05 25.01 25.01 24.87   Waist Circumference (in)  34\" 32.9\" 32.5\" 32.5\" 33.5\" 34.5\" 33.75\"       Medication Assessment & Plan    Patient's current BMI is 24.87, which is considered normal weight. Pt has lost 44 lbs overall. Patient " congratulated on success thus far!    Will order Wegovy 2.4 mg SC weekly.     Discussed lifestyle modifications, including diet and exercise. Patient education provided.     Worked with patient to create SMART Goal(s): continue these goals  1. Drink at least 64 oz of water each day  2. Walk 3 days a week at home for at least 1 mile.     Will follow-up with patient in 4 weeks, or sooner if needed.     Lela Ashton, PharmD  12/5/2023  09:54 EST

## 2023-12-06 ENCOUNTER — PHARMACY VISIT (OUTPATIENT)
Dept: PHARMACY | Facility: HOSPITAL | Age: 53
End: 2023-12-06
Payer: COMMERCIAL

## 2024-01-03 ENCOUNTER — DISEASE STATE MANAGEMENT VISIT (OUTPATIENT)
Dept: PHARMACY | Facility: HOSPITAL | Age: 54
End: 2024-01-03
Payer: COMMERCIAL

## 2024-01-03 VITALS
HEART RATE: 76 BPM | HEIGHT: 63 IN | WEIGHT: 140.8 LBS | BODY MASS INDEX: 24.95 KG/M2 | DIASTOLIC BLOOD PRESSURE: 66 MMHG | SYSTOLIC BLOOD PRESSURE: 105 MMHG

## 2024-01-03 RX ORDER — SEMAGLUTIDE 2.4 MG/.75ML
2.4 INJECTION, SOLUTION SUBCUTANEOUS WEEKLY
Qty: 3 ML | Refills: 0 | Status: SHIPPED | OUTPATIENT
Start: 2024-01-03

## 2024-01-03 NOTE — PROGRESS NOTES
[facility]  Weight Management Program      Kristy Iqbal is a 53 y.o. female referred to the [facility] by Ce Parra for clinical pharmacy and specialty pharmacy management of GLP1 for weight management. Kristy Iqbal has previously tried gym, low carb diet, Noom, Saxenda, Mounjaro and Metabalife for weight loss. The patient denies a personal history or family history of thyroid cancer, denies a personal history of pancreatitis, and denies a diagnosis of gastroparesis. Patient denies any swelling/lump/hoarseness, etc in the throat or severe abdominal pain.     Patient is currently on Wegovy 2.4 mg weekly. Patient denies any lumps/swelling/hoarseness in neck/throat or abdominal pain. Patient reports tolerating medication well without any side effects. Patient denies any trouble giving injection or any missed doses. She states that she is happy with her current weight and is trying to maintain at this point. She had attempted decreasing back to the 1.7 mg for maintenance but did not feel this was doing enough for her so wanted to increase back to the 2.4 mg dose.  She reports that she meets her water intake goal sometimes but doesn't always do so well with it. She has been meeting her walking goal. She would like to continue targeting these goals.      Relevant Past Medical History and Co-morbidities  Past Medical History:   Diagnosis Date    Heart murmur     questionable, hx scarlet fever as child    Thyroid disease      Social History     Socioeconomic History    Marital status:    Tobacco Use    Smoking status: Never    Smokeless tobacco: Never   Substance and Sexual Activity    Alcohol use: No    Drug use: No       Allergies  Ampicillin, Codeine, Penicillins, Sulfa antibiotics, Trimethoprim, and Cefdinir    Current Medication List    Current Outpatient Medications:     NON FORMULARY, Estradiol 250 mg, progesterone 8 g, testosterone 300 mg drops compounded at [facility] (6 drops at night), Disp: , Rfl:     Semaglutide-Weight Management (Wegovy) 2.4 MG/0.75ML solution auto-injector, Inject 2.4 mg under the skin into the appropriate area as directed 1 (One) Time Per Week., Disp: 3 mL, Rfl: 0    spironolactone (ALDACTONE) 50 MG tablet, Take 1 tablet by mouth 2 times every day, Disp: 60 tablet, Rfl: 6    spironolactone (ALDACTONE) 50 MG tablet, Take 1 tablet by mouth 2 times every day, Disp: 60 tablet, Rfl: 6    Thyroid ([place] Thyroid) 60 MG tablet, Take 1 tablet by mouth Daily., Disp: 30 tablet, Rfl: 6    Drug Interactions  Saxenda + Estradiol + Progesterone: hyperglycemia-Associated Agents may diminish the therapeutic effect of Antidiabetic Agents  Saxenda + Testosterone: Androgens may enhance the hypoglycemic effect of Agents with Blood Glucose Lowering Effects  No interreactions with Wegovy    Relevant Laboratory Values  Lab Results   Component Value Date    CHOL 188 09/09/2023    CHLPL 216 (H) 02/01/2016    TRIG 75 09/09/2023    HDL 49 09/09/2023     (H) 09/09/2023     There is no height or weight on file to calculate BMI.    Vaccinations:   Patient recommended to keep up with routine vaccinations.     Goals of Therapy  Clinical Goals or Therapeutic Targets: 10% weight loss goal      Date 9/12/22 10/10/22 11/7/22 12/5/22 1/3/23 1/30/23 2/28/23 3/28/23 4/25/23 5/23/23   Weight (lb) 184.4 179.6 175.4 lb 173.2 169.8 165.8 lbs 161.8 lbs  159.2 lbs 155.4lb  152.4 lbs   BMI kg/ 32.66 31.81 31.06 30.68 30.07 29.37 28.66 28.20 27.5 27   Waist Circumference (in)  40.5 in 39.25\" 38\" 39\" 36\"  * will start Wegovy 1mg 36.25\" 36.5\" 35.5\" 35\" 35\"     Date 6/20/23 7/18/23 8/15/23 9/12/23 10/10/23 11/7/23 12/5/23 1/3/24   Weight (lb) 148 lbs 145.4 lb 143.2 lb 141.4lb 141.2 lbs 141.2 lb 140.4 lb 140.8 lb   BMI kg/ 26.22 25.76 25.37 25.05 25.01 25.01 24.87 24.94   Waist Circumference (in)  34\" 32.9\" 32.5\" 32.5\" 33.5\" 34.5\" 33.75\" 32.5\"       Medication Assessment & " Plan    Patient's current BMI is 24.87, which is considered normal weight. Pt has lost 44 lbs overall. Patient congratulated on success thus far!    Will order Wegovy 2.4 mg SC weekly.     Discussed lifestyle modifications, including diet and exercise. Patient education provided.     Worked with patient to create SMART Goal(s): continue these goals  1. Drink at least 64 oz of water each day  2. Walk 3 days a week at home for at least 1 mile.     Will follow-up with patient in 4 weeks, or sooner if needed.     Anahi Larkin. Alan, PharmD  1/3/2024  15:25 EST

## 2024-01-31 ENCOUNTER — DISEASE STATE MANAGEMENT VISIT (OUTPATIENT)
Dept: PHARMACY | Facility: HOSPITAL | Age: 54
End: 2024-01-31
Payer: COMMERCIAL

## 2024-01-31 VITALS
WEIGHT: 142 LBS | BODY MASS INDEX: 25.16 KG/M2 | HEART RATE: 64 BPM | SYSTOLIC BLOOD PRESSURE: 115 MMHG | DIASTOLIC BLOOD PRESSURE: 74 MMHG | HEIGHT: 63 IN

## 2024-01-31 RX ORDER — SEMAGLUTIDE 2.4 MG/.75ML
2.4 INJECTION, SOLUTION SUBCUTANEOUS WEEKLY
Qty: 3 ML | Refills: 0 | Status: SHIPPED | OUTPATIENT
Start: 2024-01-31

## 2024-01-31 NOTE — PROGRESS NOTES
[facility]  Weight Management Program      Kristy Iqbal is a 53 y.o. female referred to the [facility] by Ce Parra for clinical pharmacy and specialty pharmacy management of GLP1 for weight management. Kristy Iqbal has previously tried gym, low carb diet, Noom, Saxenda, Mounjaro and Metabalife for weight loss. The patient denies a personal history or family history of thyroid cancer, denies a personal history of pancreatitis, and denies a diagnosis of gastroparesis. Patient denies any swelling/lump/hoarseness, etc in the throat or severe abdominal pain.     Patient is currently on Wegovy 2.4 mg weekly. Patient denies any lumps/swelling/hoarseness in neck/throat or abdominal pain. Patient reports tolerating medication well without any side effects. Patient denies any trouble giving injection or any missed doses. She states that she is happy with her current weight and is trying to maintain at this point. She had attempted decreasing back to the 1.7 mg for maintenance but did not feel this was doing enough for her so wanted to increase back to the 2.4 mg dose.  She reports that she meets her water intake goal sometimes but doesn't always do so well with it. She has been meeting her walking goal. She would like to continue targeting these goals.      Relevant Past Medical History and Co-morbidities  Past Medical History:   Diagnosis Date    Heart murmur     questionable, hx scarlet fever as child    Thyroid disease      Social History     Socioeconomic History    Marital status:    Tobacco Use    Smoking status: Never    Smokeless tobacco: Never   Substance and Sexual Activity    Alcohol use: No    Drug use: No       Allergies  Ampicillin, Codeine, Penicillins, Sulfa antibiotics, Trimethoprim, and Cefdinir    Current Medication List    Current Outpatient Medications:     NON FORMULARY, Estradiol 250 mg, progesterone 8 g, testosterone 300 mg drops compounded at [facility]  [facility] (6 drops at night), Disp: , Rfl:     Semaglutide-Weight Management (Wegovy) 2.4 MG/0.75ML solution auto-injector, Inject 2.4 mg under the skin into the appropriate area as directed 1 (One) Time Per Week., Disp: 3 mL, Rfl: 0    spironolactone (ALDACTONE) 50 MG tablet, Take 1 tablet by mouth 2 times every day, Disp: 60 tablet, Rfl: 6    spironolactone (ALDACTONE) 50 MG tablet, Take 1 tablet by mouth 2 times every day, Disp: 60 tablet, Rfl: 6    Thyroid ([place] Thyroid) 60 MG tablet, Take 1 tablet by mouth Daily., Disp: 30 tablet, Rfl: 6    Drug Interactions  Saxenda + Estradiol + Progesterone: hyperglycemia-Associated Agents may diminish the therapeutic effect of Antidiabetic Agents  Saxenda + Testosterone: Androgens may enhance the hypoglycemic effect of Agents with Blood Glucose Lowering Effects  No interreactions with Wegovy    Relevant Laboratory Values  Lab Results   Component Value Date    CHOL 188 09/09/2023    CHLPL 216 (H) 02/01/2016    TRIG 75 09/09/2023    HDL 49 09/09/2023     (H) 09/09/2023     There is no height or weight on file to calculate BMI.    Vaccinations:   Patient recommended to keep up with routine vaccinations.     Goals of Therapy  Clinical Goals or Therapeutic Targets: 10% weight loss goal      Date 9/12/22 10/10/22 11/7/22 12/5/22 1/3/23 1/30/23 2/28/23 3/28/23 4/25/23 5/23/23   Weight (lb) 184.4 179.6 175.4 lb 173.2 169.8 165.8 lbs 161.8 lbs  159.2 lbs 155.4lb  152.4 lbs   BMI kg/ 32.66 31.81 31.06 30.68 30.07 29.37 28.66 28.20 27.5 27   Waist Circumference (in)  40.5 in 39.25\" 38\" 39\" 36\"  * will start Wegovy 1mg 36.25\" 36.5\" 35.5\" 35\" 35\"     Date 6/20/23 7/18/23 8/15/23 9/12/23 10/10/23 11/7/23 12/5/23 1/3/24 1/31/24   Weight (lb) 148 lbs 145.4 lb 143.2 lb 141.4lb 141.2 lbs 141.2 lb 140.4 lb 140.8 lb 142 lb   BMI kg/ 26.22 25.76 25.37 25.05 25.01 25.01 24.87 24.94 25.15   Waist Circumference (in)  34\" 32.9\" 32.5\" 32.5\" 33.5\" 34.5\" 33.75\" 32.5\" 33\"       Medication " Assessment & Plan    Patient's current BMI is 25.15, which is considered normal weight. Pt has lost 44 lbs overall. Patient congratulated on success thus far! Pt doing well maintaining at this point.    Will order Wegovy 2.4 mg SC weekly.     Discussed lifestyle modifications, including diet and exercise. Patient education provided.     Encouraged patient to make sure she is getting adequate protein intake (Fitness Pal available to assist)     Worked with patient to create SMART Goal(s): continue these goals  1. Drink at least 64 oz of water each day  2. Walk 3 days a week at home for at least 1 mile.     Will follow-up with patient in 4 weeks, or sooner if needed.     Anahi Larkin. Alan, PharmD  1/31/2024  11:36 EST

## 2024-02-27 ENCOUNTER — DISEASE STATE MANAGEMENT VISIT (OUTPATIENT)
Dept: PHARMACY | Facility: HOSPITAL | Age: 54
End: 2024-02-27
Payer: COMMERCIAL

## 2024-02-27 VITALS
BODY MASS INDEX: 24.84 KG/M2 | HEART RATE: 67 BPM | SYSTOLIC BLOOD PRESSURE: 97 MMHG | WEIGHT: 140.2 LBS | DIASTOLIC BLOOD PRESSURE: 66 MMHG

## 2024-02-27 RX ORDER — SEMAGLUTIDE 2.4 MG/.75ML
2.4 INJECTION, SOLUTION SUBCUTANEOUS WEEKLY
Qty: 3 ML | Refills: 0 | Status: SHIPPED | OUTPATIENT
Start: 2024-02-27

## 2024-02-27 NOTE — PROGRESS NOTES
[facility]  Weight Management Program      Kristy Iqbal is a 54 y.o. female referred to the [facility] by Ce Parra for clinical pharmacy and specialty pharmacy management of GLP1 for weight management. Kristy Iqbal has previously tried gym, low carb diet, Noom, Saxenda, Mounjaro and Metabalife for weight loss. The patient denies a personal history or family history of thyroid cancer, denies a personal history of pancreatitis, and denies a diagnosis of gastroparesis. Patient denies any swelling/lump/hoarseness, etc in the throat or severe abdominal pain.     Patient is currently on Wegovy 2.4 mg weekly. Patient denies any lumps/swelling/hoarseness in neck/throat or abdominal pain. Patient reports tolerating medication well without any side effects. Patient denies any trouble giving injection or any missed doses. She states that she is happy with her current weight and is trying to maintain at this point. She had attempted decreasing back to the 1.7 mg for maintenance but did not feel this was doing enough for her so wanted to increase back to the 2.4 mg dose.  She reports that she meets her water intake goal sometimes but doesn't always do so well with it. She has been meeting her walking goal. She would like to continue targeting these goals.      Relevant Past Medical History and Co-morbidities  Past Medical History:   Diagnosis Date    Heart murmur     questionable, hx scarlet fever as child    Thyroid disease      Social History     Socioeconomic History    Marital status:    Tobacco Use    Smoking status: Never    Smokeless tobacco: Never   Substance and Sexual Activity    Alcohol use: No    Drug use: No       Allergies  Ampicillin, Codeine, Penicillins, Sulfa antibiotics, Trimethoprim, and Cefdinir    Current Medication List    Current Outpatient Medications:     NON FORMULARY, Estradiol 250 mg, progesterone 8 g, testosterone 300 mg drops compounded at [facility] (6 drops at night), Disp: , Rfl:     Semaglutide-Weight Management (Wegovy) 2.4 MG/0.75ML solution auto-injector, Inject 2.4 mg under the skin into the appropriate area as directed 1 (One) Time Per Week., Disp: 3 mL, Rfl: 0    spironolactone (ALDACTONE) 50 MG tablet, Take 1 tablet by mouth 2 times every day, Disp: 60 tablet, Rfl: 6    spironolactone (ALDACTONE) 50 MG tablet, Take 1 tablet by mouth 2 times every day, Disp: 60 tablet, Rfl: 6    Thyroid ([place] Thyroid) 60 MG tablet, Take 1 tablet by mouth Daily., Disp: 30 tablet, Rfl: 6    Drug Interactions  Saxenda + Estradiol + Progesterone: hyperglycemia-Associated Agents may diminish the therapeutic effect of Antidiabetic Agents  Saxenda + Testosterone: Androgens may enhance the hypoglycemic effect of Agents with Blood Glucose Lowering Effects  No interreactions with Wegovy    Relevant Laboratory Values  Lab Results   Component Value Date    CHOL 188 09/09/2023    CHLPL 216 (H) 02/01/2016    TRIG 75 09/09/2023    HDL 49 09/09/2023     (H) 09/09/2023     Body mass index is 24.84 kg/m².    Vaccinations:   Patient recommended to keep up with routine vaccinations.     Goals of Therapy  Clinical Goals or Therapeutic Targets: 10% weight loss goal      Date 9/12/22 10/10/22 11/7/22 12/5/22 1/3/23 1/30/23 2/28/23 3/28/23 4/25/23 5/23/23   Weight (lb) 184.4 179.6 175.4 lb 173.2 169.8 165.8 lbs 161.8 lbs  159.2 lbs 155.4lb  152.4 lbs   BMI kg/ 32.66 31.81 31.06 30.68 30.07 29.37 28.66 28.20 27.5 27   Waist Circumference (in)  40.5 in 39.25\" 38\" 39\" 36\"  * will start Wegovy 1mg 36.25\" 36.5\" 35.5\" 35\" 35\"     Date 6/20/23 7/18/23 8/15/23 9/12/23 10/10/23 11/7/23 12/5/23 1/3/24 1/31/24 2/27/24   Weight (lb) 148 lbs 145.4 lb 143.2 lb 141.4lb 141.2 lbs 141.2 lb 140.4 lb 140.8 lb 142 lb 140.2 lb   BMI kg/ 26.22 25.76 25.37 25.05 25.01 25.01 24.87 24.94 25.15 24.84   Waist Circumference (in)  34\" 32.9\" 32.5\" 32.5\" 33.5\" 34.5\" 33.75\" 32.5\" 33\" 32\" "       Medication Assessment & Plan    Patient's current BMI is 24.84, which is considered normal weight. Pt has lost 44.2 lbs overall. Patient congratulated on success thus far! Pt doing well maintaining at this point.    Will re-order Wegovy 2.4 mg SC weekly.     Discussed lifestyle modifications, including diet and exercise. Patient education provided.     Encouraged patient to make sure she is getting adequate protein intake (Fitness Pal available to assist)     Worked with patient to create SMART Goal(s): continue these goals  1. Drink at least 64 oz of water each day  2. Walk 3 days a week at home for at least 1 mile.   3. Protein intake of 60 g daily and tracking through My Fitness Pal bony     Will follow-up with patient in 4 weeks, or sooner if needed.     Jareth Hinson RPH  2/27/2024  11:30 EST

## 2024-03-26 ENCOUNTER — DISEASE STATE MANAGEMENT VISIT (OUTPATIENT)
Dept: PHARMACY | Facility: HOSPITAL | Age: 54
End: 2024-03-26
Payer: COMMERCIAL

## 2024-03-26 VITALS
WEIGHT: 142.2 LBS | HEIGHT: 63 IN | DIASTOLIC BLOOD PRESSURE: 73 MMHG | BODY MASS INDEX: 25.2 KG/M2 | SYSTOLIC BLOOD PRESSURE: 116 MMHG

## 2024-03-26 RX ORDER — SEMAGLUTIDE 2.4 MG/.75ML
2.4 INJECTION, SOLUTION SUBCUTANEOUS WEEKLY
Qty: 3 ML | Refills: 0 | Status: SHIPPED | OUTPATIENT
Start: 2024-03-26

## 2024-03-26 NOTE — PROGRESS NOTES
[facility]  Weight Management Program      Kristy Iqbal is a 54 y.o. female referred to the [facility] by Ce Parra for clinical pharmacy and specialty pharmacy management of GLP1 for weight management. Kristy Iqbal has previously tried gym, low carb diet, Noom, Saxenda, Mounjaro and Metabalife for weight loss. The patient denies a personal history or family history of thyroid cancer, denies a personal history of pancreatitis, and denies a diagnosis of gastroparesis. Patient denies any swelling/lump/hoarseness, etc in the throat or severe abdominal pain.     Patient is currently on Wegovy 2.4 mg weekly. Patient denies any lumps/swelling/hoarseness in neck/throat or abdominal pain. Patient reports tolerating medication well without any side effects. Patient denies any trouble giving injection or any missed doses. She states that she is happy with her current weight and is trying to maintain at this point. Has in the past decreased back to the 1.7 mg dose and patient did not feel this was sufficient in controlling cravings.  She reports that she meets her water intake goal sometimes but doesn't always do so well with it. She has been meeting her walking goal. She would like to continue targeting these goals.      Relevant Past Medical History and Co-morbidities  Past Medical History:   Diagnosis Date    Heart murmur     questionable, hx scarlet fever as child    Thyroid disease      Social History     Socioeconomic History    Marital status:    Tobacco Use    Smoking status: Never    Smokeless tobacco: Never   Substance and Sexual Activity    Alcohol use: No    Drug use: No       Allergies  Ampicillin, Codeine, Penicillins, Sulfa antibiotics, Trimethoprim, and Cefdinir    Current Medication List    Current Outpatient Medications:     NON FORMULARY, Estradiol 250 mg, progesterone 8 g, testosterone 300 mg drops compounded at [facility] (6 drops at night),  "Disp: , Rfl:     Semaglutide-Weight Management (Wegovy) 2.4 MG/0.75ML solution auto-injector, Inject 2.4 mg under the skin into the appropriate area as directed 1 (One) Time Per Week., Disp: 3 mL, Rfl: 0    spironolactone (ALDACTONE) 50 MG tablet, Take 1 tablet by mouth 2 times every day, Disp: 60 tablet, Rfl: 6    spironolactone (ALDACTONE) 50 MG tablet, Take 1 tablet by mouth 2 times every day, Disp: 60 tablet, Rfl: 6    Thyroid ([place] Thyroid) 60 MG tablet, Take 1 tablet by mouth Daily., Disp: 30 tablet, Rfl: 6    Drug Interactions  Saxenda + Estradiol + Progesterone: hyperglycemia-Associated Agents may diminish the therapeutic effect of Antidiabetic Agents  Saxenda + Testosterone: Androgens may enhance the hypoglycemic effect of Agents with Blood Glucose Lowering Effects  No interreactions with Wegovy    Relevant Laboratory Values  Lab Results   Component Value Date    CHOL 188 09/09/2023    CHLPL 216 (H) 02/01/2016    TRIG 75 09/09/2023    HDL 49 09/09/2023     (H) 09/09/2023     There is no height or weight on file to calculate BMI.    Vaccinations:   Patient recommended to keep up with routine vaccinations.     Goals of Therapy  Clinical Goals or Therapeutic Targets: 10% weight loss goal      Date 9/12/22 10/10/22 11/7/22 12/5/22 1/3/23 1/30/23 2/28/23 3/28/23 4/25/23 5/23/23   Weight (lb) 184.4 179.6 175.4 lb 173.2 169.8 165.8 lbs 161.8 lbs  159.2 lbs 155.4lb  152.4 lbs   BMI kg/ 32.66 31.81 31.06 30.68 30.07 29.37 28.66 28.20 27.5 27   Waist Circumference (in)  40.5 in 39.25\" 38\" 39\" 36\"  * will start Wegovy 1mg 36.25\" 36.5\" 35.5\" 35\" 35\"     Date 6/20/23 7/18/23 8/15/23 9/12/23 10/10/23 11/7/23 12/5/23 1/3/24 1/31/24 2/27/24 3/26/24   Weight (lb) 148 lbs 145.4 lb 143.2 lb 141.4lb 141.2 lbs 141.2 lb 140.4 lb 140.8 lb 142 lb 140.2 lb 142.2 lb   BMI kg/ 26.22 25.76 25.37 25.05 25.01 25.01 24.87 24.94 25.15 24.84 25.19   Waist Circumference (in)  34\" 32.9\" 32.5\" 32.5\" 33.5\" 34.5\" 33.75\" 32.5\" 33\" 32\" " "31.75\"       Medication Assessment & Plan    Patient's current BMI is 25.19, which is considered normal weight. Pt has lost 44.2 lbs overall. Patient congratulated on success thus far! Pt doing well maintaining at this point.    Will re-order Wegovy 2.4 mg SC weekly.     Discussed lifestyle modifications, including diet and exercise. Patient education provided.     Encouraged patient to make sure she is getting adequate protein intake (Fitness Pal available to assist)     Worked with patient to create SMART Goal(s): continue these goals  1. Drink at least 64 oz of water each day  2. Walk 3 days a week at home for at least 1 mile.   3. Protein intake of 60 g daily and tracking through My Fitness Pal bony     Will follow-up with patient in 4 weeks, or sooner if needed.     Anahi Larkin. Alna, PharmD  3/26/2024  11:49 EDT  "

## 2024-04-24 ENCOUNTER — DISEASE STATE MANAGEMENT VISIT (OUTPATIENT)
Dept: PHARMACY | Facility: HOSPITAL | Age: 54
End: 2024-04-24
Payer: COMMERCIAL

## 2024-04-24 VITALS
HEIGHT: 63 IN | WEIGHT: 139.2 LBS | BODY MASS INDEX: 24.66 KG/M2 | HEART RATE: 71 BPM | SYSTOLIC BLOOD PRESSURE: 106 MMHG | DIASTOLIC BLOOD PRESSURE: 68 MMHG

## 2024-04-24 RX ORDER — SEMAGLUTIDE 2.4 MG/.75ML
2.4 INJECTION, SOLUTION SUBCUTANEOUS WEEKLY
Qty: 3 ML | Refills: 0 | Status: SHIPPED | OUTPATIENT
Start: 2024-04-24

## 2024-04-24 NOTE — PROGRESS NOTES
[facility]  Weight Management Program      Kristy Iqbal is a 54 y.o. female referred to the [facility] by Ce Parra for clinical pharmacy and specialty pharmacy management of GLP1 for weight management. Kristy Iqbal has previously tried gym, low carb diet, Noom, Saxenda, Mounjaro and Metabalife for weight loss. The patient denies a personal history or family history of thyroid cancer, denies a personal history of pancreatitis, and denies a diagnosis of gastroparesis. Patient denies any swelling/lump/hoarseness, etc in the throat or severe abdominal pain.     Patient is currently on Wegovy 2.4 mg weekly. Patient denies any lumps/swelling/hoarseness in neck/throat or abdominal pain. Patient reports tolerating medication well without any side effects. Patient denies any trouble giving injection or any missed doses. She states that she is happy with her current weight and is trying to maintain at this point. Has in the past decreased back to the 1.7 mg dose and patient did not feel this was sufficient in controlling cravings.  She reports that she meets her water intake and walking goals. She would like to continue targeting these goals. Mentally, she is tracking her protein intake, but not using any bony to log.      Relevant Past Medical History and Co-morbidities  Past Medical History:   Diagnosis Date    Heart murmur     questionable, hx scarlet fever as child    Thyroid disease      Social History     Socioeconomic History    Marital status:    Tobacco Use    Smoking status: Never    Smokeless tobacco: Never   Substance and Sexual Activity    Alcohol use: No    Drug use: No       Allergies  Ampicillin, Codeine, Penicillins, Sulfa antibiotics, Trimethoprim, and Cefdinir    Current Medication List    Current Outpatient Medications:     NON FORMULARY, Estradiol 250 mg, progesterone 8 g, testosterone 300 mg drops compounded at [facility] (6 drops at night),  "Disp: , Rfl:     Semaglutide-Weight Management (Wegovy) 2.4 MG/0.75ML solution auto-injector, Inject 2.4 mg under the skin into the appropriate area as directed 1 (One) Time Per Week., Disp: 3 mL, Rfl: 0    spironolactone (ALDACTONE) 50 MG tablet, Take 1 tablet by mouth 2 times every day, Disp: 60 tablet, Rfl: 6    spironolactone (ALDACTONE) 50 MG tablet, Take 1 tablet by mouth 2 times every day, Disp: 60 tablet, Rfl: 6    Thyroid ([place] Thyroid) 60 MG tablet, Take 1 tablet by mouth Daily., Disp: 30 tablet, Rfl: 6    Drug Interactions  Saxenda + Estradiol + Progesterone: hyperglycemia-Associated Agents may diminish the therapeutic effect of Antidiabetic Agents  Saxenda + Testosterone: Androgens may enhance the hypoglycemic effect of Agents with Blood Glucose Lowering Effects  No interreactions with Wegovy    Relevant Laboratory Values  Lab Results   Component Value Date    CHOL 188 09/09/2023    CHLPL 216 (H) 02/01/2016    TRIG 75 09/09/2023    HDL 49 09/09/2023     (H) 09/09/2023     There is no height or weight on file to calculate BMI.    Vaccinations:   Patient recommended to keep up with routine vaccinations.     Goals of Therapy  Clinical Goals or Therapeutic Targets: 10% weight loss goal      Date 9/12/22 10/10/22 11/7/22 12/5/22 1/3/23 1/30/23 2/28/23 3/28/23 4/25/23 5/23/23   Weight (lb) 184.4 179.6 175.4 lb 173.2 169.8 165.8 lbs 161.8 lbs  159.2 lbs 155.4lb  152.4 lbs   BMI kg/ 32.66 31.81 31.06 30.68 30.07 29.37 28.66 28.20 27.5 27   Waist Circumference (in)  40.5 in 39.25\" 38\" 39\" 36\"  * will start Wegovy 1mg 36.25\" 36.5\" 35.5\" 35\" 35\"     Date 6/20/23 7/18/23 8/15/23 9/12/23 10/10/23 11/7/23 12/5/23 1/3/24 1/31/24 2/27/24 3/26/24 4/24/24   Weight (lb) 148 lbs 145.4 lb 143.2 lb 141.4lb 141.2 lbs 141.2 lb 140.4 lb 140.8 lb 142 lb 140.2 lb 142.2 lb 139.2   BMI kg/ 26.22 25.76 25.37 25.05 25.01 25.01 24.87 24.94 25.15 24.84 25.19 24.66   Waist Circumference (in)  34\" 32.9\" 32.5\" 32.5\" 33.5\" 34.5\" " "33.75\" 32.5\" 33\" 32\" 31.75\" 33\"       Medication Assessment & Plan    Patient's current BMI is 25.19, which is considered normal weight. Pt has lost 45.2 lbs overall. Patient congratulated on success thus far! Pt doing well maintaining at this point.    Will re-order Wegovy 2.4 mg SC weekly.     Discussed lifestyle modifications, including diet and exercise. Patient education provided.     Encouraged patient to make sure she is getting adequate protein intake (Fitness Pal available to assist)     Worked with patient to create SMART Goal(s): continue these goals  1. Drink at least 64 oz of water each day  2. Walk 3 days a week at home for at least 1 mile.   3. Protein intake of 60 g daily and tracking through My Fitness Pal bony     Will follow-up with patient in 4 weeks, or sooner if needed.     Cherie Hogan, PharmD  4/24/2024  09:01 EDT  "

## 2024-05-22 ENCOUNTER — DISEASE STATE MANAGEMENT VISIT (OUTPATIENT)
Dept: PHARMACY | Facility: HOSPITAL | Age: 54
End: 2024-05-22
Payer: COMMERCIAL

## 2024-05-22 VITALS
DIASTOLIC BLOOD PRESSURE: 66 MMHG | WEIGHT: 145 LBS | SYSTOLIC BLOOD PRESSURE: 106 MMHG | HEIGHT: 63 IN | BODY MASS INDEX: 25.69 KG/M2 | HEART RATE: 71 BPM

## 2024-05-22 RX ORDER — SEMAGLUTIDE 2.4 MG/.75ML
2.4 INJECTION, SOLUTION SUBCUTANEOUS WEEKLY
Qty: 3 ML | Refills: 0 | Status: SHIPPED | OUTPATIENT
Start: 2024-05-22

## 2024-05-22 NOTE — PROGRESS NOTES
[facility]  Weight Management Program      Kristy Iqbal is a 54 y.o. female referred to the [facility] by Ce Parra for clinical pharmacy and specialty pharmacy management of GLP1 for weight management. Kristy Iqbal has previously tried gym, low carb diet, Noom, Saxenda, Mounjaro and Metabalife for weight loss. The patient denies a personal history or family history of thyroid cancer, denies a personal history of pancreatitis, and denies a diagnosis of gastroparesis. Patient denies any swelling/lump/hoarseness, etc in the throat or severe abdominal pain.     Patient is currently on Wegovy 2.4 mg weekly. Patient denies any lumps/swelling/hoarseness in neck/throat or abdominal pain. Patient reports tolerating medication well without any side effects. Patient denies any trouble giving injection or any missed doses. She states that she is happy with her current weight and is trying to maintain at this point. Has in the past decreased back to the 1.7 mg dose and patient did not feel this was sufficient in controlling cravings.  She reports that she meets her water intake and walking goals. She would like to continue targeting these goals. Mentally, she is tracking her protein intake, but not using any bony to log. Patient feels she is still getting good appetite suppression, but states she was eating more these past couple weeks.      Relevant Past Medical History and Co-morbidities  Past Medical History:   Diagnosis Date    Heart murmur     questionable, hx scarlet fever as child    Thyroid disease      Social History     Socioeconomic History    Marital status:    Tobacco Use    Smoking status: Never    Smokeless tobacco: Never   Substance and Sexual Activity    Alcohol use: No    Drug use: No       Allergies  Ampicillin, Codeine, Penicillins, Sulfa antibiotics, Trimethoprim, and Cefdinir    Current Medication List    Current Outpatient Medications:     ciprofloxacin  "(CIPRO) 500 MG tablet, Take 1 tablet by mouth every 12 hours for 7 days, Disp: 14 tablet, Rfl: 0    NON FORMULARY, Estradiol 250 mg, progesterone 8 g, testosterone 300 mg drops compounded at [facility] (6 drops at night), Disp: , Rfl:     Semaglutide-Weight Management (Wegovy) 2.4 MG/0.75ML solution auto-injector, Inject 2.4 mg under the skin into the appropriate area as directed 1 (One) Time Per Week., Disp: 3 mL, Rfl: 0    spironolactone (ALDACTONE) 50 MG tablet, Take 1 tablet by mouth 2 times every day, Disp: 60 tablet, Rfl: 6    spironolactone (ALDACTONE) 50 MG tablet, Take 1 tablet by mouth 2 times every day, Disp: 60 tablet, Rfl: 6    Thyroid ([place] Thyroid) 60 MG tablet, Take 1 tablet by mouth Daily., Disp: 30 tablet, Rfl: 6    Drug Interactions  Saxenda + Estradiol + Progesterone: hyperglycemia-Associated Agents may diminish the therapeutic effect of Antidiabetic Agents  Saxenda + Testosterone: Androgens may enhance the hypoglycemic effect of Agents with Blood Glucose Lowering Effects  No interreactions with Wegovy    Relevant Laboratory Values  Lab Results   Component Value Date    CHOL 188 09/09/2023    CHLPL 216 (H) 02/01/2016    TRIG 75 09/09/2023    HDL 49 09/09/2023     (H) 09/09/2023     Body mass index is 25.69 kg/m².    Vaccinations:   Patient recommended to keep up with routine vaccinations.     Goals of Therapy  Clinical Goals or Therapeutic Targets: 10% weight loss goal      Date 9/12/22 10/10/22 11/7/22 12/5/22 1/3/23 1/30/23 2/28/23 3/28/23 4/25/23 5/23/23   Weight (lb) 184.4 179.6 175.4 lb 173.2 169.8 165.8 lbs 161.8 lbs  159.2 lbs 155.4lb  152.4 lbs   BMI kg/ 32.66 31.81 31.06 30.68 30.07 29.37 28.66 28.20 27.5 27   Waist Circumference (in)  40.5 in 39.25\" 38\" 39\" 36\"  * will start Wegovy 1mg 36.25\" 36.5\" 35.5\" 35\" 35\"     Date 6/20/23 7/18/23 8/15/23 9/12/23 10/10/23 11/7/23 12/5/23 1/3/24 1/31/24 2/27/24 3/26/24 4/24/24   Weight (lb) 148 lbs 145.4 lb 143.2 lb 141.4lb 141.2 " "lbs 141.2 lb 140.4 lb 140.8 lb 142 lb 140.2 lb 142.2 lb 139.2   BMI kg/ 26.22 25.76 25.37 25.05 25.01 25.01 24.87 24.94 25.15 24.84 25.19 24.66   Waist Circumference (in)  34\" 32.9\" 32.5\" 32.5\" 33.5\" 34.5\" 33.75\" 32.5\" 33\" 32\" 31.75\" 33\"     Date 5/22/24     Weight (lb) 145 lb     BMI kg/ 25.69     Waist Circumference (in)  33\"         Medication Assessment & Plan    Patient's current BMI is 25.19, which is considered normal weight. Pt has lost 45.2 lbs overall. Patient congratulated on success thus far! Pt doing well maintaining at this point.    Will re-order Wegovy 2.4 mg SC weekly.     Discussed lifestyle modifications, including diet and exercise. Patient education provided.     Encouraged patient to make sure she is getting adequate protein intake (Fitness Pal available to assist)     Worked with patient to create SMART Goal(s): continue these goals  1. Drink at least 64 oz of water each day  2. Walk 3 days a week at home for at least 1 mile.   3. Protein intake of 60 g daily and tracking through My Fitness Pal bony     Will follow-up with patient in 4 weeks, or sooner if needed.     Cherie Hogan, PharmD  5/22/2024  09:04 EDT  "

## 2024-06-19 ENCOUNTER — DISEASE STATE MANAGEMENT VISIT (OUTPATIENT)
Dept: PHARMACY | Facility: HOSPITAL | Age: 54
End: 2024-06-19
Payer: COMMERCIAL

## 2024-06-19 VITALS
HEIGHT: 63 IN | SYSTOLIC BLOOD PRESSURE: 112 MMHG | BODY MASS INDEX: 25.45 KG/M2 | WEIGHT: 143.6 LBS | DIASTOLIC BLOOD PRESSURE: 74 MMHG | HEART RATE: 64 BPM

## 2024-06-19 RX ORDER — SEMAGLUTIDE 2.4 MG/.75ML
2.4 INJECTION, SOLUTION SUBCUTANEOUS WEEKLY
Qty: 3 ML | Refills: 0 | Status: SHIPPED | OUTPATIENT
Start: 2024-06-19

## 2024-06-19 NOTE — PROGRESS NOTES
[facility]  Weight Management Program      Kristy Iqbal is a 54 y.o. female referred to the [facility] by Ce Parra for clinical pharmacy and specialty pharmacy management of GLP1 for weight management. Kristy Iqbal has previously tried gym, low carb diet, Noom, Saxenda, Mounjaro and Metabalife for weight loss. The patient denies a personal history or family history of thyroid cancer, denies a personal history of pancreatitis, and denies a diagnosis of gastroparesis. Patient denies any swelling/lump/hoarseness, etc in the throat or severe abdominal pain.     Patient is currently on Wegovy 2.4 mg weekly. Patient denies any lumps/swelling/hoarseness in neck/throat or abdominal pain. Patient reports tolerating medication well without any side effects. Patient denies any trouble giving injection or any missed doses. She states that she is happy with her current weight and is trying to maintain at this point. Has in the past decreased back to the 1.7 mg dose and patient did not feel this was sufficient in controlling cravings.  She reports that she meets her water intake and walking goals. She would like to continue targeting these goals. Mentally, she is tracking her protein intake, but not using any bony to log. Patient feels she is still getting good appetite suppression, but states she was eating more these past couple weeks.      Relevant Past Medical History and Co-morbidities  Past Medical History:   Diagnosis Date    Heart murmur     questionable, hx scarlet fever as child    Thyroid disease      Social History     Socioeconomic History    Marital status:    Tobacco Use    Smoking status: Never    Smokeless tobacco: Never   Substance and Sexual Activity    Alcohol use: No    Drug use: No       Allergies  Ampicillin, Codeine, Penicillins, Sulfa antibiotics, Trimethoprim, and Cefdinir    Current Medication List    Current Outpatient Medications:     ciprofloxacin  "(CIPRO) 500 MG tablet, Take 1 tablet by mouth every 12 hours for 7 days, Disp: 14 tablet, Rfl: 0    NON FORMULARY, Estradiol 250 mg, progesterone 8 g, testosterone 300 mg drops compounded at [facility] (6 drops at night), Disp: , Rfl:     Semaglutide-Weight Management (Wegovy) 2.4 MG/0.75ML solution auto-injector, Inject 2.4 mg under the skin into the appropriate area as directed 1 (One) Time Per Week., Disp: 3 mL, Rfl: 0    spironolactone (ALDACTONE) 50 MG tablet, Take 1 tablet by mouth 2 times every day, Disp: 60 tablet, Rfl: 6    spironolactone (ALDACTONE) 50 MG tablet, Take 1 tablet by mouth 2 times every day, Disp: 60 tablet, Rfl: 6    Thyroid ([place] Thyroid) 60 MG tablet, Take 1 tablet by mouth Daily., Disp: 30 tablet, Rfl: 6    Drug Interactions  Saxenda + Estradiol + Progesterone: hyperglycemia-Associated Agents may diminish the therapeutic effect of Antidiabetic Agents  Saxenda + Testosterone: Androgens may enhance the hypoglycemic effect of Agents with Blood Glucose Lowering Effects  No interreactions with Wegovy    Relevant Laboratory Values  Lab Results   Component Value Date    CHOL 188 09/09/2023    CHLPL 216 (H) 02/01/2016    TRIG 75 09/09/2023    HDL 49 09/09/2023     (H) 09/09/2023     Body mass index is 25.44 kg/m².    Vaccinations:   Patient recommended to keep up with routine vaccinations.     Goals of Therapy  Clinical Goals or Therapeutic Targets: 10% weight loss goal      Date 9/12/22 10/10/22 11/7/22 12/5/22 1/3/23 1/30/23 2/28/23 3/28/23 4/25/23 5/23/23   Weight (lb) 184.4 179.6 175.4 lb 173.2 169.8 165.8 lbs 161.8 lbs  159.2 lbs 155.4lb  152.4 lbs   BMI kg/ 32.66 31.81 31.06 30.68 30.07 29.37 28.66 28.20 27.5 27   Waist Circumference (in)  40.5 in 39.25\" 38\" 39\" 36\"  * will start Wegovy 1mg 36.25\" 36.5\" 35.5\" 35\" 35\"     Date 6/20/23 7/18/23 8/15/23 9/12/23 10/10/23 11/7/23 12/5/23 1/3/24 1/31/24 2/27/24 3/26/24 4/24/24   Weight (lb) 148 lbs 145.4 lb 143.2 lb 141.4lb 141.2 " "lbs 141.2 lb 140.4 lb 140.8 lb 142 lb 140.2 lb 142.2 lb 139.2   BMI kg/ 26.22 25.76 25.37 25.05 25.01 25.01 24.87 24.94 25.15 24.84 25.19 24.66   Waist Circumference (in)  34\" 32.9\" 32.5\" 32.5\" 33.5\" 34.5\" 33.75\" 32.5\" 33\" 32\" 31.75\" 33\"     Date 5/22/24 6/19/24    Weight (lb) 145 lb 143.6 lb    BMI kg/ 25.69 25.44    Waist Circumference (in)  33\" 33.25\"        Medication Assessment & Plan    Patient's current BMI is 25.19, which is considered normal weight. Pt has lost 45.2 lbs overall. Patient congratulated on success thus far! Pt doing well maintaining at this point.    Will re-order Wegovy 2.4 mg SC weekly.     Discussed lifestyle modifications, including diet and exercise. Patient education provided.     Encouraged patient to make sure she is getting adequate protein intake (Fitness Pal available to assist)     Worked with patient to create SMART Goal(s): continue these goals  1. Drink at least 64 oz of water each day  2. Walk 3 days a week at home for at least 1 mile.   3. Protein intake of 60 g daily and tracking through My Fitness Pal bony     Will follow-up with patient in 4 weeks, or sooner if needed.     Cherie Hogan, PharmD  6/19/2024  10:22 EDT  "

## 2024-07-17 ENCOUNTER — DISEASE STATE MANAGEMENT VISIT (OUTPATIENT)
Dept: PHARMACY | Facility: HOSPITAL | Age: 54
End: 2024-07-17
Payer: COMMERCIAL

## 2024-07-17 VITALS
DIASTOLIC BLOOD PRESSURE: 65 MMHG | BODY MASS INDEX: 25.48 KG/M2 | SYSTOLIC BLOOD PRESSURE: 103 MMHG | WEIGHT: 143.8 LBS | HEIGHT: 63 IN | HEART RATE: 61 BPM

## 2024-07-17 RX ORDER — SEMAGLUTIDE 2.4 MG/.75ML
2.4 INJECTION, SOLUTION SUBCUTANEOUS WEEKLY
Qty: 9 ML | Refills: 0 | Status: SHIPPED | OUTPATIENT
Start: 2024-07-17

## 2024-07-17 NOTE — PROGRESS NOTES
[facility]  Weight Management Program      Kristy Iqbal is a 54 y.o. female referred to the [facility] by Ce Parra for clinical pharmacy and specialty pharmacy management of GLP1 for weight management. Kristy Iqbal has previously tried gym, low carb diet, Noom, Saxenda, Mounjaro and Metabalife for weight loss. The patient denies a personal history or family history of thyroid cancer, denies a personal history of pancreatitis, and denies a diagnosis of gastroparesis. Patient denies any swelling/lump/hoarseness, etc in the throat or severe abdominal pain.     Patient is currently on Wegovy 2.4 mg weekly. Patient denies any lumps/swelling/hoarseness in neck/throat or abdominal pain. Patient reports tolerating medication well without any side effects. Patient denies any trouble giving injection or any missed doses. She states that she is happy with her current weight and is trying to maintain at this point. Has in the past decreased back to the 1.7 mg dose and patient did not feel this was sufficient in controlling cravings.     She reports that she meets her water intake and walking goals. She would like to continue targeting these goals. Mentally, she is tracking her protein intake, but not using any bony to log. Patient feels she is still getting good appetite suppression, but states she was eating more these past couple weeks. Patient reports drinking about 50 oz of water daily.      Relevant Past Medical History and Co-morbidities  Past Medical History:   Diagnosis Date    Heart murmur     questionable, hx scarlet fever as child    Thyroid disease      Social History     Socioeconomic History    Marital status:    Tobacco Use    Smoking status: Never    Smokeless tobacco: Never   Substance and Sexual Activity    Alcohol use: No    Drug use: No       Allergies  Ampicillin, Codeine, Penicillins, Sulfa antibiotics, Trimethoprim, and Cefdinir    Current Medication  "List    Current Outpatient Medications:     ciprofloxacin (CIPRO) 500 MG tablet, Take 1 tablet by mouth every 12 hours for 7 days, Disp: 14 tablet, Rfl: 0    NON FORMULARY, Estradiol 250 mg, progesterone 8 g, testosterone 300 mg drops compounded at [facility] (6 drops at night), Disp: , Rfl:     Semaglutide-Weight Management (Wegovy) 2.4 MG/0.75ML solution auto-injector, Inject 2.4 mg under the skin into the appropriate area as directed 1 (One) Time Per Week., Disp: 3 mL, Rfl: 0    spironolactone (ALDACTONE) 50 MG tablet, Take 1 tablet by mouth 2 times every day, Disp: 60 tablet, Rfl: 6    spironolactone (ALDACTONE) 50 MG tablet, Take 1 tablet by mouth 2 times every day, Disp: 60 tablet, Rfl: 6    Thyroid ([place] Thyroid) 60 MG tablet, Take 1 tablet by mouth Daily., Disp: 30 tablet, Rfl: 6    Drug Interactions  Saxenda + Estradiol + Progesterone: hyperglycemia-Associated Agents may diminish the therapeutic effect of Antidiabetic Agents  Saxenda + Testosterone: Androgens may enhance the hypoglycemic effect of Agents with Blood Glucose Lowering Effects  No interreactions with Wegovy    Relevant Laboratory Values  Lab Results   Component Value Date    CHOL 188 09/09/2023    CHLPL 216 (H) 02/01/2016    TRIG 75 09/09/2023    HDL 49 09/09/2023     (H) 09/09/2023     There is no height or weight on file to calculate BMI.    Vaccinations:   Patient recommended to keep up with routine vaccinations.     Goals of Therapy  Clinical Goals or Therapeutic Targets: 10% weight loss goal      Date 9/12/22 10/10/22 11/7/22 12/5/22 1/3/23 1/30/23 2/28/23 3/28/23 4/25/23 5/23/23   Weight (lb) 184.4 179.6 175.4 lb 173.2 169.8 165.8 lbs 161.8 lbs  159.2 lbs 155.4lb  152.4 lbs   BMI kg/ 32.66 31.81 31.06 30.68 30.07 29.37 28.66 28.20 27.5 27   Waist Circumference (in)  40.5 in 39.25\" 38\" 39\" 36\"  * will start Wegovy 1mg 36.25\" 36.5\" 35.5\" 35\" 35\"     Date 6/20/23 7/18/23 8/15/23 9/12/23 10/10/23 11/7/23 12/5/23 1/3/24 " "1/31/24 2/27/24 3/26/24 4/24/24   Weight (lb) 148 lbs 145.4 lb 143.2 lb 141.4lb 141.2 lbs 141.2 lb 140.4 lb 140.8 lb 142 lb 140.2 lb 142.2 lb 139.2   BMI kg/ 26.22 25.76 25.37 25.05 25.01 25.01 24.87 24.94 25.15 24.84 25.19 24.66   Waist Circumference (in)  34\" 32.9\" 32.5\" 32.5\" 33.5\" 34.5\" 33.75\" 32.5\" 33\" 32\" 31.75\" 33\"     Date 5/22/24 6/19/24 7/17/24   Weight (lb) 145 lb 143.6 lb 143.8   BMI kg/ 25.69 25.44 25.47   Waist Circumference (in)  33\" 33.25\" 32\"       Medication Assessment & Plan    Patient's current BMI is 25.19, which is considered normal weight. Pt has lost 45.2 lbs overall. Patient congratulated on success thus far! Pt doing well maintaining at this point.    Will re-order Wegovy 2.4 mg SC weekly.     Discussed lifestyle modifications, including diet and exercise. Patient education provided.     Encouraged patient to make sure she is getting adequate protein intake (Fitness Pal available to assist)     Worked with patient to create SMART Goal(s): continue these goals  1. Drink at least 64 oz of water each day  2. Walk 3 days a week at home for at least 1 mile.   3. Protein intake of 60 g daily and tracking through My [employer] Pal bony     90 day supply sent today due to patient having [employer] insurance. She is aware she will need to continue follow ups until medication completed.    Will follow-up with patient in 4 weeks, or sooner if needed.     Cherie Hogan, PharmD  7/17/2024  10:31 EDT  "

## 2024-08-14 ENCOUNTER — TRANSCRIBE ORDERS (OUTPATIENT)
Dept: ADMINISTRATIVE | Facility: HOSPITAL | Age: 54
End: 2024-08-14
Payer: COMMERCIAL

## 2024-08-14 ENCOUNTER — DISEASE STATE MANAGEMENT VISIT (OUTPATIENT)
Dept: PHARMACY | Facility: HOSPITAL | Age: 54
End: 2024-08-14
Payer: COMMERCIAL

## 2024-08-14 ENCOUNTER — LAB (OUTPATIENT)
Dept: LAB | Facility: HOSPITAL | Age: 54
End: 2024-08-14
Payer: COMMERCIAL

## 2024-08-14 VITALS
DIASTOLIC BLOOD PRESSURE: 76 MMHG | SYSTOLIC BLOOD PRESSURE: 110 MMHG | BODY MASS INDEX: 25.41 KG/M2 | HEIGHT: 63 IN | HEART RATE: 87 BPM | WEIGHT: 143.4 LBS

## 2024-08-14 DIAGNOSIS — E03.9 MYXEDEMA HEART DISEASE: Primary | ICD-10-CM

## 2024-08-14 DIAGNOSIS — I51.9 MYXEDEMA HEART DISEASE: Primary | ICD-10-CM

## 2024-08-14 DIAGNOSIS — I51.9 MYXEDEMA HEART DISEASE: ICD-10-CM

## 2024-08-14 DIAGNOSIS — E03.9 MYXEDEMA HEART DISEASE: ICD-10-CM

## 2024-08-14 LAB
T3FREE SERPL-MCNC: 3.46 PG/ML (ref 2–4.4)
T4 FREE SERPL-MCNC: 0.73 NG/DL (ref 0.92–1.68)
TSH SERPL DL<=0.05 MIU/L-ACNC: 2.47 UIU/ML (ref 0.27–4.2)

## 2024-08-14 PROCEDURE — 36415 COLL VENOUS BLD VENIPUNCTURE: CPT

## 2024-08-14 PROCEDURE — 84481 FREE ASSAY (FT-3): CPT

## 2024-08-14 PROCEDURE — 84439 ASSAY OF FREE THYROXINE: CPT

## 2024-08-14 PROCEDURE — 84443 ASSAY THYROID STIM HORMONE: CPT

## 2024-08-14 NOTE — PROGRESS NOTES
[facility]  Weight Management Program      Kristy Iqbal is a 54 y.o. female referred to the [facility] by Ce Parra for clinical pharmacy and specialty pharmacy management of GLP1 for weight management. Kristy Iqbal has previously tried gym, low carb diet, Noom, Saxenda, Mounjaro and Metabalife for weight loss. The patient denies a personal history or family history of thyroid cancer, denies a personal history of pancreatitis, and denies a diagnosis of gastroparesis. Patient denies any swelling/lump/hoarseness, etc in the throat or severe abdominal pain.     Patient is currently on Wegovy 2.4 mg weekly. Patient denies any lumps/swelling/hoarseness in neck/throat or abdominal pain. Patient reports tolerating medication well without any side effects. Patient denies any trouble giving injection or any missed doses. She states that she is happy with her current weight and is trying to maintain at this point. Has in the past decreased back to the 1.7 mg dose and patient did not feel this was sufficient in controlling cravings.     She reports that she meets her water intake and walking goals. She would like to continue targeting these goals. Mentally, she is tracking her protein intake, but not using any bony to log. Patient feels she is still getting good appetite suppression, but states she was eating more these past couple weeks. Patient reports drinking about 50 oz of water daily. Appetite suppression has been better than last month per patient.      Relevant Past Medical History and Co-morbidities  Past Medical History:   Diagnosis Date    Heart murmur     questionable, hx scarlet fever as child    Thyroid disease      Social History     Socioeconomic History    Marital status:    Tobacco Use    Smoking status: Never    Smokeless tobacco: Never   Substance and Sexual Activity    Alcohol use: No    Drug use: No       Allergies  Ampicillin, Codeine, Penicillins,  "Sulfa antibiotics, Trimethoprim, and Cefdinir    Current Medication List    Current Outpatient Medications:     ciprofloxacin (CIPRO) 500 MG tablet, Take 1 tablet by mouth every 12 hours for 7 days, Disp: 14 tablet, Rfl: 0    NON FORMULARY, Estradiol 250 mg, progesterone 8 g, testosterone 300 mg drops compounded at [facility] (6 drops at night), Disp: , Rfl:     Semaglutide-Weight Management (Wegovy) 2.4 MG/0.75ML solution auto-injector, Inject 2.4 mg under the skin into the appropriate area as directed 1 (One) Time Per Week., Disp: 9 mL, Rfl: 0    spironolactone (ALDACTONE) 50 MG tablet, Take 1 tablet by mouth 2 times every day, Disp: 60 tablet, Rfl: 6    spironolactone (ALDACTONE) 50 MG tablet, Take 1 tablet by mouth 2 times every day, Disp: 60 tablet, Rfl: 6    Thyroid ([place] Thyroid) 60 MG tablet, Take 1 tablet by mouth Daily., Disp: 30 tablet, Rfl: 6    Drug Interactions  Saxenda + Estradiol + Progesterone: hyperglycemia-Associated Agents may diminish the therapeutic effect of Antidiabetic Agents  Saxenda + Testosterone: Androgens may enhance the hypoglycemic effect of Agents with Blood Glucose Lowering Effects  No interreactions with Wegovy    Relevant Laboratory Values  Lab Results   Component Value Date    CHOL 188 09/09/2023    CHLPL 216 (H) 02/01/2016    TRIG 75 09/09/2023    HDL 49 09/09/2023     (H) 09/09/2023     There is no height or weight on file to calculate BMI.    Vaccinations:   Patient recommended to keep up with routine vaccinations.     Goals of Therapy  Clinical Goals or Therapeutic Targets: 10% weight loss goal      Date 9/12/22 10/10/22 11/7/22 12/5/22 1/3/23 1/30/23 2/28/23 3/28/23 4/25/23 5/23/23   Weight (lb) 184.4 179.6 175.4 lb 173.2 169.8 165.8 lbs 161.8 lbs  159.2 lbs 155.4lb  152.4 lbs   BMI kg/ 32.66 31.81 31.06 30.68 30.07 29.37 28.66 28.20 27.5 27   Waist Circumference (in)  40.5 in 39.25\" 38\" 39\" 36\"  * will start Wegovy 1mg 36.25\" 36.5\" 35.5\" 35\" 35\"     Date " "6/20/23 7/18/23 8/15/23 9/12/23 10/10/23 11/7/23 12/5/23 1/3/24 1/31/24 2/27/24 3/26/24 4/24/24   Weight (lb) 148 lbs 145.4 lb 143.2 lb 141.4lb 141.2 lbs 141.2 lb 140.4 lb 140.8 lb 142 lb 140.2 lb 142.2 lb 139.2   BMI kg/ 26.22 25.76 25.37 25.05 25.01 25.01 24.87 24.94 25.15 24.84 25.19 24.66   Waist Circumference (in)  34\" 32.9\" 32.5\" 32.5\" 33.5\" 34.5\" 33.75\" 32.5\" 33\" 32\" 31.75\" 33\"     Date 5/22/24 6/19/24 7/17/24 8/24/24   Weight (lb) 145 lb 143.6 lb 143.8 .4 lb   BMI kg/ 25.69 25.44 25.47 25.40   Waist Circumference (in)  33\" 33.25\" 32\" 32.5\"       Medication Assessment & Plan    Patient's current BMI is 25.19, which is considered normal weight. Pt has lost 45.2 lbs overall. Patient congratulated on success thus far! Pt doing well maintaining at this point.    Will re-order Wegovy 2.4 mg SC weekly.     Discussed lifestyle modifications, including diet and exercise. Patient education provided.     Encouraged patient to make sure she is getting adequate protein intake (Fitness Pal available to assist)     Worked with patient to create SMART Goal(s): continue these goals  1. Drink at least 64 oz of water each day  2. Walk 3 days a week at home for at least 1 mile.   3. Protein intake of 60 g daily and tracking through My Fitness Pal bony     90 day supply sent today due to patient having [employer] insurance. She is aware she will need to continue follow ups until medication completed.    Will follow-up with patient in 4 weeks, or sooner if needed.     Cherie Hogan, PharmD  8/14/2024  09:05 EDT  "

## 2024-09-11 ENCOUNTER — DISEASE STATE MANAGEMENT VISIT (OUTPATIENT)
Dept: PHARMACY | Facility: HOSPITAL | Age: 54
End: 2024-09-11
Payer: COMMERCIAL

## 2024-09-11 VITALS
DIASTOLIC BLOOD PRESSURE: 75 MMHG | WEIGHT: 143.8 LBS | BODY MASS INDEX: 25.48 KG/M2 | HEART RATE: 72 BPM | SYSTOLIC BLOOD PRESSURE: 114 MMHG | HEIGHT: 63 IN

## 2024-09-11 NOTE — PROGRESS NOTES
[facility]  Weight Management Program      Kristy Iqbal is a 54 y.o. female referred to the [facility] by Ce Parra for clinical pharmacy and specialty pharmacy management of GLP1 for weight management. Kristy Iqbal has previously tried gym, low carb diet, Noom, Saxenda, Mounjaro and Metabalife for weight loss. The patient denies a personal history or family history of thyroid cancer, denies a personal history of pancreatitis, and denies a diagnosis of gastroparesis. Patient denies any swelling/lump/hoarseness, etc in the throat or severe abdominal pain.     Patient is currently on Wegovy 2.4 mg weekly. Patient denies any lumps/swelling/hoarseness in neck/throat or abdominal pain. Patient reports tolerating medication well without any side effects. Patient denies any trouble giving injection or any missed doses. She states that she is happy with her current weight and is trying to maintain at this point. Has in the past decreased back to the 1.7 mg dose and patient did not feel this was sufficient in controlling cravings.     She reports that she meets her water intake and walking goals. She would like to continue targeting these goals. Mentally, she is tracking her protein intake, but not using any bony to log. Patient feels she is still getting good appetite suppression, but states she was eating more these past couple weeks. Patient reports drinking about 50 oz of water daily. Appetite suppression has been better than last month per patient.      Relevant Past Medical History and Co-morbidities  Past Medical History:   Diagnosis Date    Heart murmur     questionable, hx scarlet fever as child    Thyroid disease      Social History     Socioeconomic History    Marital status:    Tobacco Use    Smoking status: Never    Smokeless tobacco: Never   Substance and Sexual Activity    Alcohol use: No    Drug use: No       Allergies  Ampicillin, Codeine, Penicillins,  Sulfa antibiotics, Trimethoprim, and Cefdinir    Current Medication List    Current Outpatient Medications:     ciprofloxacin (CIPRO) 500 MG tablet, Take 1 tablet by mouth every 12 hours for 7 days, Disp: 14 tablet, Rfl: 0    Diclofenac Sodium (Voltaren) 1 % gel gel, Apply 2 grams to the affected area(s) 4 times every day, Disp: 100 g, Rfl: 3    levothyroxine (SYNTHROID, LEVOTHROID) 75 MCG tablet, Take 1 tablet by mouth every day, Disp: 30 tablet, Rfl: 2    NON FORMULARY, Estradiol 250 mg, progesterone 8 g, testosterone 300 mg drops compounded at [facility] (6 drops at night), Disp: , Rfl:     Semaglutide-Weight Management (Wegovy) 2.4 MG/0.75ML solution auto-injector, Inject 2.4 mg under the skin into the appropriate area as directed 1 (One) Time Per Week., Disp: 9 mL, Rfl: 0    spironolactone (ALDACTONE) 50 MG tablet, Take 1 tablet by mouth 2 times every day, Disp: 60 tablet, Rfl: 6    spironolactone (ALDACTONE) 50 MG tablet, Take 1 tablet by mouth 2 times every day, Disp: 60 tablet, Rfl: 6    Thyroid ([place] Thyroid) 60 MG tablet, Take 1 tablet by mouth Daily., Disp: 30 tablet, Rfl: 6    Drug Interactions  Saxenda + Estradiol + Progesterone: hyperglycemia-Associated Agents may diminish the therapeutic effect of Antidiabetic Agents  Saxenda + Testosterone: Androgens may enhance the hypoglycemic effect of Agents with Blood Glucose Lowering Effects  No interreactions with Wegovy    Relevant Laboratory Values  Lab Results   Component Value Date    CHOL 188 09/09/2023    CHLPL 216 (H) 02/01/2016    TRIG 75 09/09/2023    HDL 49 09/09/2023     (H) 09/09/2023     Body mass index is 25.47 kg/m².    Vaccinations:   Patient recommended to keep up with routine vaccinations.     Goals of Therapy  Clinical Goals or Therapeutic Targets: 10% weight loss goal      Date 9/12/22 10/10/22 11/7/22 12/5/22 1/3/23 1/30/23 2/28/23 3/28/23 4/25/23 5/23/23   Weight (lb) 184.4 179.6 175.4 lb 173.2 169.8 165.8 lbs 161.8 lbs   "159.2 lbs 155.4lb  152.4 lbs   BMI kg/ 32.66 31.81 31.06 30.68 30.07 29.37 28.66 28.20 27.5 27   Waist Circumference (in)  40.5 in 39.25\" 38\" 39\" 36\"  * will start Wegovy 1mg 36.25\" 36.5\" 35.5\" 35\" 35\"     Date 6/20/23 7/18/23 8/15/23 9/12/23 10/10/23 11/7/23 12/5/23 1/3/24 1/31/24 2/27/24 3/26/24 4/24/24   Weight (lb) 148 lbs 145.4 lb 143.2 lb 141.4lb 141.2 lbs 141.2 lb 140.4 lb 140.8 lb 142 lb 140.2 lb 142.2 lb 139.2   BMI kg/ 26.22 25.76 25.37 25.05 25.01 25.01 24.87 24.94 25.15 24.84 25.19 24.66   Waist Circumference (in)  34\" 32.9\" 32.5\" 32.5\" 33.5\" 34.5\" 33.75\" 32.5\" 33\" 32\" 31.75\" 33\"     Date 5/22/24 6/19/24 7/17/24 8/24/24 9/11/24   Weight (lb) 145 lb 143.6 lb 143.8 .4 lb 143.8 lb   BMI kg/ 25.69 25.44 25.47 25.40 25.47   Waist Circumference (in)  33\" 33.25\" 32\" 32.5\" 30.25\"       Medication Assessment & Plan    Patient's current BMI is 25.19, which is considered normal weight. Pt has lost 45.2 lbs overall. Patient congratulated on success thus far! Pt doing well maintaining at this point.    Will re-order Wegovy 2.4 mg SC weekly.     Discussed lifestyle modifications, including diet and exercise. Patient education provided.     Encouraged patient to make sure she is getting adequate protein intake (Fitness Pal available to assist)     Worked with patient to create SMART Goal(s): continue these goals  1. Drink at least 64 oz of water each day  2. Walk 3 days a week at home for at least 1 mile.   3. Protein intake of 60 g daily and tracking through My Fitness Pal bony     90 day supply sent today due to patient having [facility] insurance. She is aware she will need to continue follow ups until medication completed.    This will be patient's last appointment due to insurance coverage at [facility]. Patient can be discharged at this time    Will follow-up as needed, or sooner if needed.     Cherie Hogan, PharmD  9/11/2024  09:30 EDT  "

## 2024-10-15 ENCOUNTER — LAB (OUTPATIENT)
Dept: LAB | Facility: HOSPITAL | Age: 54
End: 2024-10-15
Payer: COMMERCIAL

## 2024-10-15 ENCOUNTER — TRANSCRIBE ORDERS (OUTPATIENT)
Dept: ADMINISTRATIVE | Facility: HOSPITAL | Age: 54
End: 2024-10-15
Payer: COMMERCIAL

## 2024-10-15 DIAGNOSIS — I51.9 MYXEDEMA HEART DISEASE: Primary | ICD-10-CM

## 2024-10-15 DIAGNOSIS — E03.9 MYXEDEMA HEART DISEASE: Primary | ICD-10-CM

## 2024-10-15 DIAGNOSIS — I51.9 MYXEDEMA HEART DISEASE: ICD-10-CM

## 2024-10-15 DIAGNOSIS — E03.9 MYXEDEMA HEART DISEASE: ICD-10-CM

## 2024-10-15 LAB
T4 FREE SERPL-MCNC: 1.1 NG/DL (ref 0.92–1.68)
TSH SERPL DL<=0.05 MIU/L-ACNC: 1.42 UIU/ML (ref 0.27–4.2)

## 2024-10-15 PROCEDURE — 36415 COLL VENOUS BLD VENIPUNCTURE: CPT

## 2024-10-15 PROCEDURE — 84439 ASSAY OF FREE THYROXINE: CPT

## 2024-10-15 PROCEDURE — 84443 ASSAY THYROID STIM HORMONE: CPT

## 2025-02-19 ENCOUNTER — TRANSCRIBE ORDERS (OUTPATIENT)
Dept: ADMINISTRATIVE | Facility: HOSPITAL | Age: 55
End: 2025-02-19
Payer: COMMERCIAL

## 2025-02-19 DIAGNOSIS — Z12.31 VISIT FOR SCREENING MAMMOGRAM: Primary | ICD-10-CM

## 2025-08-18 ENCOUNTER — TRANSCRIBE ORDERS (OUTPATIENT)
Dept: ADMINISTRATIVE | Facility: HOSPITAL | Age: 55
End: 2025-08-18
Payer: COMMERCIAL

## 2025-08-18 ENCOUNTER — LAB (OUTPATIENT)
Dept: LAB | Facility: HOSPITAL | Age: 55
End: 2025-08-18
Payer: COMMERCIAL

## 2025-08-18 DIAGNOSIS — N95.1 FEMALE CLIMACTERIC STATE: ICD-10-CM

## 2025-08-18 DIAGNOSIS — N95.1 MENOPAUSAL STATE: ICD-10-CM

## 2025-08-18 DIAGNOSIS — N95.1 FEMALE CLIMACTERIC STATE: Primary | ICD-10-CM

## 2025-08-18 LAB
25(OH)D3 SERPL-MCNC: 22.8 NG/ML (ref 30–100)
ANION GAP SERPL CALCULATED.3IONS-SCNC: 11.1 MMOL/L (ref 5–15)
BASOPHILS # BLD AUTO: 0.07 10*3/MM3 (ref 0–0.2)
BASOPHILS NFR BLD AUTO: 1.6 % (ref 0–1.5)
BUN SERPL-MCNC: 14 MG/DL (ref 6–20)
BUN/CREAT SERPL: 13.3 (ref 7–25)
CALCIUM SPEC-SCNC: 9.2 MG/DL (ref 8.6–10.5)
CHLORIDE SERPL-SCNC: 104 MMOL/L (ref 98–107)
CHOLEST SERPL-MCNC: 188 MG/DL (ref 0–200)
CO2 SERPL-SCNC: 22.9 MMOL/L (ref 22–29)
CREAT SERPL-MCNC: 1.05 MG/DL (ref 0.57–1)
DEPRECATED RDW RBC AUTO: 42.4 FL (ref 37–54)
EGFRCR SERPLBLD CKD-EPI 2021: 62.1 ML/MIN/1.73
EOSINOPHIL # BLD AUTO: 0.15 10*3/MM3 (ref 0–0.4)
EOSINOPHIL NFR BLD AUTO: 3.4 % (ref 0.3–6.2)
ERYTHROCYTE [DISTWIDTH] IN BLOOD BY AUTOMATED COUNT: 12.2 % (ref 12.3–15.4)
FOLATE SERPL-MCNC: 6.92 NG/ML (ref 4.78–24.2)
GLUCOSE SERPL-MCNC: 61 MG/DL (ref 65–99)
HCT VFR BLD AUTO: 41.7 % (ref 34–46.6)
HDLC SERPL-MCNC: 54 MG/DL (ref 40–60)
HGB BLD-MCNC: 13.4 G/DL (ref 12–15.9)
IMM GRANULOCYTES # BLD AUTO: 0.01 10*3/MM3 (ref 0–0.05)
IMM GRANULOCYTES NFR BLD AUTO: 0.2 % (ref 0–0.5)
LDLC SERPL CALC-MCNC: 114 MG/DL (ref 0–100)
LDLC/HDLC SERPL: 2.06 {RATIO}
LYMPHOCYTES # BLD AUTO: 1.28 10*3/MM3 (ref 0.7–3.1)
LYMPHOCYTES NFR BLD AUTO: 28.8 % (ref 19.6–45.3)
MCH RBC QN AUTO: 30.5 PG (ref 26.6–33)
MCHC RBC AUTO-ENTMCNC: 32.1 G/DL (ref 31.5–35.7)
MCV RBC AUTO: 94.8 FL (ref 79–97)
MONOCYTES # BLD AUTO: 0.32 10*3/MM3 (ref 0.1–0.9)
MONOCYTES NFR BLD AUTO: 7.2 % (ref 5–12)
NEUTROPHILS NFR BLD AUTO: 2.62 10*3/MM3 (ref 1.7–7)
NEUTROPHILS NFR BLD AUTO: 58.8 % (ref 42.7–76)
NRBC BLD AUTO-RTO: 0 /100 WBC (ref 0–0.2)
PLATELET # BLD AUTO: 246 10*3/MM3 (ref 140–450)
PMV BLD AUTO: 10.6 FL (ref 6–12)
POTASSIUM SERPL-SCNC: 4 MMOL/L (ref 3.5–5.2)
PROGEST SERPL-MCNC: 0.27 NG/ML
RBC # BLD AUTO: 4.4 10*6/MM3 (ref 3.77–5.28)
SODIUM SERPL-SCNC: 138 MMOL/L (ref 136–145)
T4 FREE SERPL-MCNC: 1.25 NG/DL (ref 0.92–1.68)
TRIGL SERPL-MCNC: 114 MG/DL (ref 0–150)
TSH SERPL DL<=0.05 MIU/L-ACNC: 3.51 UIU/ML (ref 0.27–4.2)
VIT B12 BLD-MCNC: 293 PG/ML (ref 211–946)
VLDLC SERPL-MCNC: 20 MG/DL (ref 5–40)
WBC NRBC COR # BLD AUTO: 4.45 10*3/MM3 (ref 3.4–10.8)

## 2025-08-18 PROCEDURE — 82306 VITAMIN D 25 HYDROXY: CPT

## 2025-08-18 PROCEDURE — 82746 ASSAY OF FOLIC ACID SERUM: CPT

## 2025-08-18 PROCEDURE — 82627 DEHYDROEPIANDROSTERONE: CPT

## 2025-08-18 PROCEDURE — 80061 LIPID PANEL: CPT

## 2025-08-18 PROCEDURE — 84402 ASSAY OF FREE TESTOSTERONE: CPT

## 2025-08-18 PROCEDURE — 84403 ASSAY OF TOTAL TESTOSTERONE: CPT

## 2025-08-18 PROCEDURE — 84443 ASSAY THYROID STIM HORMONE: CPT

## 2025-08-18 PROCEDURE — 85025 COMPLETE CBC W/AUTO DIFF WBC: CPT

## 2025-08-18 PROCEDURE — 36415 COLL VENOUS BLD VENIPUNCTURE: CPT

## 2025-08-18 PROCEDURE — 80048 BASIC METABOLIC PNL TOTAL CA: CPT

## 2025-08-18 PROCEDURE — 82607 VITAMIN B-12: CPT

## 2025-08-18 PROCEDURE — 84144 ASSAY OF PROGESTERONE: CPT

## 2025-08-18 PROCEDURE — 84439 ASSAY OF FREE THYROXINE: CPT

## 2025-08-19 LAB — DHEA-S SERPL-MCNC: 68.3 UG/DL (ref 29.4–220.5)

## 2025-08-23 LAB
TESTOST FREE SERPL-MCNC: 0.5 PG/ML (ref 0–4.2)
TESTOST SERPL-MCNC: 12 NG/DL (ref 4–50)